# Patient Record
Sex: MALE | Race: WHITE
[De-identification: names, ages, dates, MRNs, and addresses within clinical notes are randomized per-mention and may not be internally consistent; named-entity substitution may affect disease eponyms.]

---

## 2016-01-01 LAB — HGBA1C: 7.8 % (ref ?–5.7)

## 2017-01-01 ENCOUNTER — PRIOR ORIGINAL RECORDS (OUTPATIENT)
Dept: OTHER | Age: 77
End: 2017-01-01

## 2017-01-01 ENCOUNTER — HOSPITAL ENCOUNTER (OUTPATIENT)
Dept: CARDIOLOGY CLINIC | Facility: HOSPITAL | Age: 77
Discharge: HOME | End: 2017-01-01
Attending: NURSE PRACTITIONER
Payer: MEDICARE

## 2017-01-01 ENCOUNTER — SNF/IP PROF CHARGE ONLY (OUTPATIENT)
Dept: HEMATOLOGY/ONCOLOGY | Facility: HOSPITAL | Age: 77
End: 2017-01-01

## 2017-01-01 ENCOUNTER — TELEPHONE (OUTPATIENT)
Dept: INTERNAL MEDICINE CLINIC | Facility: CLINIC | Age: 77
End: 2017-01-01

## 2017-01-01 ENCOUNTER — APPOINTMENT (OUTPATIENT)
Dept: CT IMAGING | Facility: HOSPITAL | Age: 77
DRG: 291 | End: 2017-01-01
Attending: INTERNAL MEDICINE
Payer: MEDICARE

## 2017-01-01 ENCOUNTER — TELEPHONE (OUTPATIENT)
Dept: CARDIOLOGY CLINIC | Facility: HOSPITAL | Age: 77
End: 2017-01-01

## 2017-01-01 ENCOUNTER — OFFICE VISIT (OUTPATIENT)
Dept: HEMATOLOGY/ONCOLOGY | Age: 77
End: 2017-01-01
Attending: INTERNAL MEDICINE
Payer: MEDICARE

## 2017-01-01 ENCOUNTER — APPOINTMENT (OUTPATIENT)
Dept: INTERVENTIONAL RADIOLOGY/VASCULAR | Facility: HOSPITAL | Age: 77
DRG: 682 | End: 2017-01-01
Attending: INTERNAL MEDICINE
Payer: MEDICARE

## 2017-01-01 ENCOUNTER — HOSPITAL ENCOUNTER (OUTPATIENT)
Dept: CARDIOLOGY CLINIC | Facility: HOSPITAL | Age: 77
End: 2017-01-01
Attending: NURSE PRACTITIONER
Payer: MEDICARE

## 2017-01-01 ENCOUNTER — OFFICE VISIT (OUTPATIENT)
Dept: INTERNAL MEDICINE CLINIC | Facility: CLINIC | Age: 77
End: 2017-01-01

## 2017-01-01 ENCOUNTER — PATIENT OUTREACH (OUTPATIENT)
Dept: INTERNAL MEDICINE CLINIC | Facility: CLINIC | Age: 77
End: 2017-01-01

## 2017-01-01 ENCOUNTER — APPOINTMENT (OUTPATIENT)
Dept: HEMATOLOGY/ONCOLOGY | Age: 77
End: 2017-01-01
Attending: INTERNAL MEDICINE
Payer: MEDICARE

## 2017-01-01 ENCOUNTER — PATIENT OUTREACH (OUTPATIENT)
Dept: CASE MANAGEMENT | Age: 77
End: 2017-01-01

## 2017-01-01 ENCOUNTER — HOSPITAL ENCOUNTER (INPATIENT)
Facility: HOSPITAL | Age: 77
LOS: 4 days | Discharge: HOME HEALTH | DRG: 291 | End: 2017-01-01
Attending: EMERGENCY MEDICINE | Admitting: HOSPITALIST
Payer: MEDICARE

## 2017-01-01 ENCOUNTER — HOSPITAL ENCOUNTER (OUTPATIENT)
Dept: ULTRASOUND IMAGING | Facility: HOSPITAL | Age: 77
Discharge: HOME | DRG: 291 | End: 2017-01-01
Attending: ORTHOPAEDIC SURGERY
Payer: MEDICARE

## 2017-01-01 ENCOUNTER — TELEPHONE (OUTPATIENT)
Dept: CARDIOLOGY UNIT | Facility: HOSPITAL | Age: 77
End: 2017-01-01

## 2017-01-01 ENCOUNTER — HOSPITAL ENCOUNTER (OUTPATIENT)
Dept: NUCLEAR MEDICINE | Facility: HOSPITAL | Age: 77
Discharge: HOME | End: 2017-01-01
Attending: INTERNAL MEDICINE
Payer: MEDICARE

## 2017-01-01 ENCOUNTER — APPOINTMENT (OUTPATIENT)
Dept: GENERAL RADIOLOGY | Facility: HOSPITAL | Age: 77
DRG: 682 | End: 2017-01-01
Attending: INTERNAL MEDICINE
Payer: MEDICARE

## 2017-01-01 ENCOUNTER — TELEPHONE (OUTPATIENT)
Dept: HEMATOLOGY/ONCOLOGY | Facility: HOSPITAL | Age: 77
End: 2017-01-01

## 2017-01-01 ENCOUNTER — HOSPITAL ENCOUNTER (OUTPATIENT)
Dept: CT IMAGING | Age: 77
Discharge: HOME | End: 2017-01-01
Attending: INTERNAL MEDICINE
Payer: MEDICARE

## 2017-01-01 ENCOUNTER — OFFICE VISIT (OUTPATIENT)
Dept: HEMATOLOGY/ONCOLOGY | Age: 77
End: 2017-01-01
Attending: NURSE PRACTITIONER
Payer: MEDICARE

## 2017-01-01 ENCOUNTER — OFFICE VISIT (OUTPATIENT)
Dept: HEMATOLOGY/ONCOLOGY | Facility: HOSPITAL | Age: 77
End: 2017-01-01
Attending: INTERNAL MEDICINE
Payer: MEDICARE

## 2017-01-01 ENCOUNTER — HOSPITAL ENCOUNTER (INPATIENT)
Facility: HOSPITAL | Age: 77
LOS: 6 days | DRG: 682 | End: 2017-01-01
Attending: HOSPITALIST | Admitting: HOSPITALIST
Payer: MEDICARE

## 2017-01-01 ENCOUNTER — DIETICIAN VISIT (OUTPATIENT)
Dept: HEMATOLOGY/ONCOLOGY | Facility: HOSPITAL | Age: 77
End: 2017-01-01

## 2017-01-01 ENCOUNTER — APPOINTMENT (OUTPATIENT)
Dept: GENERAL RADIOLOGY | Facility: HOSPITAL | Age: 77
DRG: 291 | End: 2017-01-01
Attending: INTERNAL MEDICINE
Payer: MEDICARE

## 2017-01-01 ENCOUNTER — APPOINTMENT (OUTPATIENT)
Dept: GENERAL RADIOLOGY | Facility: HOSPITAL | Age: 77
DRG: 682 | End: 2017-01-01
Attending: HOSPITALIST
Payer: MEDICARE

## 2017-01-01 ENCOUNTER — HOSPITAL ENCOUNTER (OUTPATIENT)
Dept: LAB | Facility: HOSPITAL | Age: 77
Discharge: HOME | End: 2017-01-01
Attending: NURSE PRACTITIONER
Payer: MEDICARE

## 2017-01-01 ENCOUNTER — OFFICE VISIT (OUTPATIENT)
Dept: HEMATOLOGY/ONCOLOGY | Facility: HOSPITAL | Age: 77
End: 2017-01-01
Attending: NURSE PRACTITIONER
Payer: MEDICARE

## 2017-01-01 ENCOUNTER — HOSPITAL ENCOUNTER (OUTPATIENT)
Dept: GENERAL RADIOLOGY | Age: 77
Discharge: HOME | End: 2017-01-01
Attending: CLINICAL NURSE SPECIALIST
Payer: MEDICARE

## 2017-01-01 ENCOUNTER — APPOINTMENT (OUTPATIENT)
Dept: CV DIAGNOSTICS | Facility: HOSPITAL | Age: 77
DRG: 291 | End: 2017-01-01
Attending: INTERNAL MEDICINE
Payer: MEDICARE

## 2017-01-01 ENCOUNTER — HOSPITAL ENCOUNTER (OUTPATIENT)
Dept: GENERAL RADIOLOGY | Age: 77
Discharge: HOME | End: 2017-01-01
Attending: INTERNAL MEDICINE
Payer: MEDICARE

## 2017-01-01 ENCOUNTER — APPOINTMENT (OUTPATIENT)
Dept: HEMATOLOGY/ONCOLOGY | Facility: HOSPITAL | Age: 77
End: 2017-01-01
Payer: MEDICARE

## 2017-01-01 ENCOUNTER — APPOINTMENT (OUTPATIENT)
Dept: HEMATOLOGY/ONCOLOGY | Age: 77
End: 2017-01-01
Payer: MEDICARE

## 2017-01-01 ENCOUNTER — LAB ENCOUNTER (OUTPATIENT)
Dept: LAB | Age: 77
End: 2017-01-01
Attending: INTERNAL MEDICINE
Payer: MEDICARE

## 2017-01-01 ENCOUNTER — MED REC SCAN ONLY (OUTPATIENT)
Dept: INTERNAL MEDICINE CLINIC | Facility: CLINIC | Age: 77
End: 2017-01-01

## 2017-01-01 ENCOUNTER — APPOINTMENT (OUTPATIENT)
Dept: GENERAL RADIOLOGY | Facility: HOSPITAL | Age: 77
DRG: 291 | End: 2017-01-01
Attending: EMERGENCY MEDICINE
Payer: MEDICARE

## 2017-01-01 VITALS
SYSTOLIC BLOOD PRESSURE: 104 MMHG | OXYGEN SATURATION: 100 % | DIASTOLIC BLOOD PRESSURE: 57 MMHG | BODY MASS INDEX: 32.67 KG/M2 | RESPIRATION RATE: 20 BRPM | HEART RATE: 88 BPM | WEIGHT: 246.5 LBS | TEMPERATURE: 97 F | HEIGHT: 73 IN

## 2017-01-01 VITALS
WEIGHT: 260 LBS | DIASTOLIC BLOOD PRESSURE: 74 MMHG | HEART RATE: 62 BPM | TEMPERATURE: 98 F | BODY MASS INDEX: 34.46 KG/M2 | HEIGHT: 72.99 IN | SYSTOLIC BLOOD PRESSURE: 122 MMHG | OXYGEN SATURATION: 95 % | RESPIRATION RATE: 20 BRPM

## 2017-01-01 VITALS
OXYGEN SATURATION: 96 % | TEMPERATURE: 99 F | HEART RATE: 76 BPM | HEIGHT: 72.99 IN | SYSTOLIC BLOOD PRESSURE: 129 MMHG | DIASTOLIC BLOOD PRESSURE: 47 MMHG | BODY MASS INDEX: 34.72 KG/M2 | RESPIRATION RATE: 20 BRPM | WEIGHT: 262 LBS

## 2017-01-01 VITALS
OXYGEN SATURATION: 94 % | TEMPERATURE: 98 F | HEIGHT: 70.98 IN | RESPIRATION RATE: 20 BRPM | HEART RATE: 87 BPM | WEIGHT: 262.19 LBS | DIASTOLIC BLOOD PRESSURE: 45 MMHG | BODY MASS INDEX: 36.71 KG/M2 | SYSTOLIC BLOOD PRESSURE: 92 MMHG

## 2017-01-01 VITALS
SYSTOLIC BLOOD PRESSURE: 110 MMHG | RESPIRATION RATE: 20 BRPM | BODY MASS INDEX: 37 KG/M2 | DIASTOLIC BLOOD PRESSURE: 62 MMHG | HEART RATE: 83 BPM | OXYGEN SATURATION: 98 % | WEIGHT: 279.19 LBS

## 2017-01-01 VITALS
DIASTOLIC BLOOD PRESSURE: 72 MMHG | HEART RATE: 85 BPM | RESPIRATION RATE: 20 BRPM | BODY MASS INDEX: 37 KG/M2 | SYSTOLIC BLOOD PRESSURE: 111 MMHG | TEMPERATURE: 98 F | WEIGHT: 266.69 LBS | RESPIRATION RATE: 18 BRPM | OXYGEN SATURATION: 98 % | DIASTOLIC BLOOD PRESSURE: 63 MMHG | HEART RATE: 80 BPM | TEMPERATURE: 98 F | SYSTOLIC BLOOD PRESSURE: 108 MMHG | OXYGEN SATURATION: 95 %

## 2017-01-01 VITALS
HEART RATE: 80 BPM | SYSTOLIC BLOOD PRESSURE: 119 MMHG | OXYGEN SATURATION: 91 % | BODY MASS INDEX: 38 KG/M2 | RESPIRATION RATE: 18 BRPM | WEIGHT: 272.19 LBS | DIASTOLIC BLOOD PRESSURE: 64 MMHG

## 2017-01-01 VITALS
SYSTOLIC BLOOD PRESSURE: 100 MMHG | RESPIRATION RATE: 20 BRPM | TEMPERATURE: 98 F | HEART RATE: 81 BPM | OXYGEN SATURATION: 96 % | BODY MASS INDEX: 36 KG/M2 | DIASTOLIC BLOOD PRESSURE: 63 MMHG | WEIGHT: 256 LBS

## 2017-01-01 VITALS
RESPIRATION RATE: 18 BRPM | OXYGEN SATURATION: 92 % | HEART RATE: 81 BPM | SYSTOLIC BLOOD PRESSURE: 123 MMHG | DIASTOLIC BLOOD PRESSURE: 78 MMHG | TEMPERATURE: 97 F

## 2017-01-01 VITALS
DIASTOLIC BLOOD PRESSURE: 53 MMHG | BODY MASS INDEX: 35 KG/M2 | SYSTOLIC BLOOD PRESSURE: 105 MMHG | OXYGEN SATURATION: 96 % | RESPIRATION RATE: 16 BRPM | HEART RATE: 76 BPM | WEIGHT: 247.5 LBS

## 2017-01-01 VITALS
BODY MASS INDEX: 37 KG/M2 | RESPIRATION RATE: 20 BRPM | HEART RATE: 68 BPM | WEIGHT: 265.19 LBS | SYSTOLIC BLOOD PRESSURE: 108 MMHG | OXYGEN SATURATION: 91 % | DIASTOLIC BLOOD PRESSURE: 57 MMHG

## 2017-01-01 VITALS
TEMPERATURE: 98 F | BODY MASS INDEX: 37.38 KG/M2 | DIASTOLIC BLOOD PRESSURE: 75 MMHG | WEIGHT: 267 LBS | RESPIRATION RATE: 18 BRPM | HEART RATE: 61 BPM | SYSTOLIC BLOOD PRESSURE: 104 MMHG | OXYGEN SATURATION: 90 % | HEIGHT: 70.98 IN

## 2017-01-01 VITALS
BODY MASS INDEX: 37 KG/M2 | WEIGHT: 262.13 LBS | DIASTOLIC BLOOD PRESSURE: 48 MMHG | SYSTOLIC BLOOD PRESSURE: 106 MMHG | OXYGEN SATURATION: 94 % | RESPIRATION RATE: 16 BRPM | HEART RATE: 83 BPM

## 2017-01-01 VITALS
DIASTOLIC BLOOD PRESSURE: 63 MMHG | HEART RATE: 75 BPM | SYSTOLIC BLOOD PRESSURE: 129 MMHG | BODY MASS INDEX: 37 KG/M2 | WEIGHT: 263.19 LBS | OXYGEN SATURATION: 96 % | RESPIRATION RATE: 18 BRPM

## 2017-01-01 VITALS
HEART RATE: 79 BPM | OXYGEN SATURATION: 91 % | HEIGHT: 71 IN | SYSTOLIC BLOOD PRESSURE: 101 MMHG | BODY MASS INDEX: 38.08 KG/M2 | RESPIRATION RATE: 20 BRPM | DIASTOLIC BLOOD PRESSURE: 60 MMHG | WEIGHT: 272 LBS | TEMPERATURE: 97 F

## 2017-01-01 VITALS
TEMPERATURE: 98 F | HEART RATE: 87 BPM | WEIGHT: 260.19 LBS | RESPIRATION RATE: 20 BRPM | BODY MASS INDEX: 36 KG/M2 | SYSTOLIC BLOOD PRESSURE: 104 MMHG | OXYGEN SATURATION: 97 % | DIASTOLIC BLOOD PRESSURE: 69 MMHG

## 2017-01-01 VITALS
HEIGHT: 73 IN | DIASTOLIC BLOOD PRESSURE: 60 MMHG | OXYGEN SATURATION: 95 % | SYSTOLIC BLOOD PRESSURE: 110 MMHG | RESPIRATION RATE: 16 BRPM | TEMPERATURE: 98 F | WEIGHT: 260 LBS | HEART RATE: 86 BPM | BODY MASS INDEX: 34.46 KG/M2

## 2017-01-01 VITALS
DIASTOLIC BLOOD PRESSURE: 48 MMHG | HEIGHT: 70.98 IN | RESPIRATION RATE: 16 BRPM | HEART RATE: 83 BPM | WEIGHT: 262 LBS | OXYGEN SATURATION: 94 % | BODY MASS INDEX: 36.68 KG/M2 | SYSTOLIC BLOOD PRESSURE: 106 MMHG | TEMPERATURE: 98 F

## 2017-01-01 VITALS
SYSTOLIC BLOOD PRESSURE: 81 MMHG | WEIGHT: 247.56 LBS | OXYGEN SATURATION: 94 % | BODY MASS INDEX: 33 KG/M2 | DIASTOLIC BLOOD PRESSURE: 46 MMHG | TEMPERATURE: 98 F

## 2017-01-01 VITALS
SYSTOLIC BLOOD PRESSURE: 107 MMHG | HEART RATE: 85 BPM | OXYGEN SATURATION: 96 % | DIASTOLIC BLOOD PRESSURE: 68 MMHG | RESPIRATION RATE: 20 BRPM | TEMPERATURE: 98 F

## 2017-01-01 VITALS
TEMPERATURE: 98 F | OXYGEN SATURATION: 95 % | RESPIRATION RATE: 14 BRPM | SYSTOLIC BLOOD PRESSURE: 118 MMHG | DIASTOLIC BLOOD PRESSURE: 54 MMHG | HEART RATE: 92 BPM

## 2017-01-01 VITALS
HEART RATE: 67 BPM | WEIGHT: 260.13 LBS | DIASTOLIC BLOOD PRESSURE: 61 MMHG | OXYGEN SATURATION: 95 % | BODY MASS INDEX: 34 KG/M2 | RESPIRATION RATE: 24 BRPM | SYSTOLIC BLOOD PRESSURE: 119 MMHG

## 2017-01-01 VITALS
OXYGEN SATURATION: 98 % | RESPIRATION RATE: 20 BRPM | SYSTOLIC BLOOD PRESSURE: 109 MMHG | HEIGHT: 70.98 IN | HEART RATE: 81 BPM | DIASTOLIC BLOOD PRESSURE: 73 MMHG | TEMPERATURE: 98 F | WEIGHT: 252 LBS | BODY MASS INDEX: 35.28 KG/M2

## 2017-01-01 VITALS
HEART RATE: 63 BPM | SYSTOLIC BLOOD PRESSURE: 120 MMHG | RESPIRATION RATE: 16 BRPM | DIASTOLIC BLOOD PRESSURE: 56 MMHG | BODY MASS INDEX: 35 KG/M2 | OXYGEN SATURATION: 92 % | WEIGHT: 268.5 LBS

## 2017-01-01 VITALS
SYSTOLIC BLOOD PRESSURE: 112 MMHG | RESPIRATION RATE: 18 BRPM | DIASTOLIC BLOOD PRESSURE: 69 MMHG | TEMPERATURE: 98 F | HEART RATE: 73 BPM

## 2017-01-01 VITALS
SYSTOLIC BLOOD PRESSURE: 108 MMHG | OXYGEN SATURATION: 93 % | HEART RATE: 84 BPM | TEMPERATURE: 98 F | DIASTOLIC BLOOD PRESSURE: 71 MMHG | RESPIRATION RATE: 18 BRPM

## 2017-01-01 VITALS
RESPIRATION RATE: 20 BRPM | DIASTOLIC BLOOD PRESSURE: 62 MMHG | HEART RATE: 75 BPM | WEIGHT: 271.63 LBS | OXYGEN SATURATION: 88 % | BODY MASS INDEX: 36 KG/M2 | SYSTOLIC BLOOD PRESSURE: 109 MMHG

## 2017-01-01 VITALS
TEMPERATURE: 99 F | BODY MASS INDEX: 34.46 KG/M2 | SYSTOLIC BLOOD PRESSURE: 112 MMHG | HEIGHT: 73 IN | OXYGEN SATURATION: 96 % | HEART RATE: 74 BPM | DIASTOLIC BLOOD PRESSURE: 72 MMHG | RESPIRATION RATE: 16 BRPM | WEIGHT: 260 LBS

## 2017-01-01 VITALS
BODY MASS INDEX: 35 KG/M2 | WEIGHT: 265.31 LBS | RESPIRATION RATE: 18 BRPM | DIASTOLIC BLOOD PRESSURE: 73 MMHG | SYSTOLIC BLOOD PRESSURE: 118 MMHG | HEART RATE: 72 BPM | OXYGEN SATURATION: 96 %

## 2017-01-01 VITALS
SYSTOLIC BLOOD PRESSURE: 114 MMHG | HEART RATE: 72 BPM | DIASTOLIC BLOOD PRESSURE: 64 MMHG | WEIGHT: 278.13 LBS | OXYGEN SATURATION: 94 % | BODY MASS INDEX: 39 KG/M2

## 2017-01-01 DIAGNOSIS — M25.511 ACUTE PAIN OF RIGHT SHOULDER: Primary | ICD-10-CM

## 2017-01-01 DIAGNOSIS — I50.9 ACUTE ON CHRONIC CONGESTIVE HEART FAILURE, UNSPECIFIED CONGESTIVE HEART FAILURE TYPE: ICD-10-CM

## 2017-01-01 DIAGNOSIS — R09.89 CHEST CONGESTION: Primary | ICD-10-CM

## 2017-01-01 DIAGNOSIS — C34.82 MALIGNANT NEOPLASM OF OVERLAPPING SITES OF LEFT LUNG (HCC): ICD-10-CM

## 2017-01-01 DIAGNOSIS — T45.1X5A PANCYTOPENIA DUE TO ANTINEOPLASTIC CHEMOTHERAPY (HCC): ICD-10-CM

## 2017-01-01 DIAGNOSIS — I50.22 CHRONIC SYSTOLIC HF (HEART FAILURE) (HCC): ICD-10-CM

## 2017-01-01 DIAGNOSIS — C34.82 MALIGNANT NEOPLASM OF OVERLAPPING SITES OF LEFT LUNG (HCC): Primary | ICD-10-CM

## 2017-01-01 DIAGNOSIS — C34.32 MALIGNANT NEOPLASM OF LOWER LOBE OF LEFT LUNG (HCC): ICD-10-CM

## 2017-01-01 DIAGNOSIS — M25.531 PAIN IN BOTH WRISTS: ICD-10-CM

## 2017-01-01 DIAGNOSIS — K21.9 GASTROESOPHAGEAL REFLUX DISEASE WITHOUT ESOPHAGITIS: Primary | ICD-10-CM

## 2017-01-01 DIAGNOSIS — I50.22 CHRONIC SYSTOLIC HF (HEART FAILURE) (HCC): Primary | ICD-10-CM

## 2017-01-01 DIAGNOSIS — R62.7 FAILURE TO THRIVE IN ADULT: ICD-10-CM

## 2017-01-01 DIAGNOSIS — D64.9 ANEMIA: ICD-10-CM

## 2017-01-01 DIAGNOSIS — D63.8 ANEMIA, CHRONIC DISEASE: ICD-10-CM

## 2017-01-01 DIAGNOSIS — T50.905D MEDICATION SIDE EFFECT, SUBSEQUENT ENCOUNTER: ICD-10-CM

## 2017-01-01 DIAGNOSIS — R53.1 WEAKNESS: ICD-10-CM

## 2017-01-01 DIAGNOSIS — E87.1 HYPONATREMIA: ICD-10-CM

## 2017-01-01 DIAGNOSIS — E11.42 DIABETIC POLYNEUROPATHY ASSOCIATED WITH TYPE 2 DIABETES MELLITUS (HCC): ICD-10-CM

## 2017-01-01 DIAGNOSIS — B35.6 FUNGAL INFECTION OF THE GROIN: ICD-10-CM

## 2017-01-01 DIAGNOSIS — D69.6 THROMBOCYTOPENIA (HCC): ICD-10-CM

## 2017-01-01 DIAGNOSIS — M25.511 RIGHT SHOULDER PAIN, UNSPECIFIED CHRONICITY: ICD-10-CM

## 2017-01-01 DIAGNOSIS — T45.1X5A ANEMIA DUE TO CHEMOTHERAPY: ICD-10-CM

## 2017-01-01 DIAGNOSIS — R59.0 MEDIASTINAL ADENOPATHY: ICD-10-CM

## 2017-01-01 DIAGNOSIS — D61.818 PANCYTOPENIA (HCC): ICD-10-CM

## 2017-01-01 DIAGNOSIS — R05.9 COUGH: Primary | ICD-10-CM

## 2017-01-01 DIAGNOSIS — C34.12 MALIGNANT NEOPLASM OF UPPER LOBE OF LEFT LUNG (HCC): ICD-10-CM

## 2017-01-01 DIAGNOSIS — I20.8 ATYPICAL ANGINA (HCC): Primary | ICD-10-CM

## 2017-01-01 DIAGNOSIS — M54.50 CHRONIC BILATERAL LOW BACK PAIN WITHOUT SCIATICA: ICD-10-CM

## 2017-01-01 DIAGNOSIS — J44.1 COPD EXACERBATION (HCC): Primary | ICD-10-CM

## 2017-01-01 DIAGNOSIS — D64.81 ANEMIA DUE TO CHEMOTHERAPY: ICD-10-CM

## 2017-01-01 DIAGNOSIS — E11.21 DIABETIC NEPHROPATHY ASSOCIATED WITH TYPE 2 DIABETES MELLITUS (HCC): ICD-10-CM

## 2017-01-01 DIAGNOSIS — I50.22 CHRONIC SYSTOLIC CHF (CONGESTIVE HEART FAILURE), NYHA CLASS 3 (HCC): Primary | ICD-10-CM

## 2017-01-01 DIAGNOSIS — C34.91 BRONCHOGENIC CANCER OF RIGHT LUNG (HCC): Primary | ICD-10-CM

## 2017-01-01 DIAGNOSIS — C34.81 MALIGNANT NEOPLASM OF OVERLAPPING SITES OF RIGHT LUNG (HCC): Primary | ICD-10-CM

## 2017-01-01 DIAGNOSIS — D64.81 ANEMIA DUE TO ANTINEOPLASTIC CHEMOTHERAPY: ICD-10-CM

## 2017-01-01 DIAGNOSIS — D75.89 MACROCYTOSIS: ICD-10-CM

## 2017-01-01 DIAGNOSIS — R59.0 MEDIASTINAL ADENOPATHY: Primary | ICD-10-CM

## 2017-01-01 DIAGNOSIS — C34.81 MALIGNANT NEOPLASM OF OVERLAPPING SITES OF RIGHT LUNG (HCC): ICD-10-CM

## 2017-01-01 DIAGNOSIS — I50.9 CHF (CONGESTIVE HEART FAILURE) (HCC): ICD-10-CM

## 2017-01-01 DIAGNOSIS — I50.22 CHRONIC SYSTOLIC HEART FAILURE (HCC): Primary | ICD-10-CM

## 2017-01-01 DIAGNOSIS — D61.810 PANCYTOPENIA DUE TO ANTINEOPLASTIC CHEMOTHERAPY (HCC): Primary | ICD-10-CM

## 2017-01-01 DIAGNOSIS — M25.532 PAIN IN BOTH WRISTS: ICD-10-CM

## 2017-01-01 DIAGNOSIS — J44.9 CHRONIC OBSTRUCTIVE PULMONARY DISEASE, UNSPECIFIED COPD TYPE (HCC): Primary | ICD-10-CM

## 2017-01-01 DIAGNOSIS — I20.8 ATYPICAL ANGINA (HCC): ICD-10-CM

## 2017-01-01 DIAGNOSIS — D75.89 MACROCYTOSIS: Primary | ICD-10-CM

## 2017-01-01 DIAGNOSIS — I50.9 ACUTE ON CHRONIC CONGESTIVE HEART FAILURE, UNSPECIFIED CONGESTIVE HEART FAILURE TYPE: Primary | ICD-10-CM

## 2017-01-01 DIAGNOSIS — Z71.9 ENCOUNTER FOR EDUCATION: Primary | ICD-10-CM

## 2017-01-01 DIAGNOSIS — M25.811 MASS OF JOINT OF RIGHT SHOULDER: ICD-10-CM

## 2017-01-01 DIAGNOSIS — M25.511 SHOULDER PAIN, RIGHT: ICD-10-CM

## 2017-01-01 DIAGNOSIS — M25.511 ACUTE PAIN OF RIGHT SHOULDER: ICD-10-CM

## 2017-01-01 DIAGNOSIS — T45.1X5A PANCYTOPENIA DUE TO ANTINEOPLASTIC CHEMOTHERAPY (HCC): Primary | ICD-10-CM

## 2017-01-01 DIAGNOSIS — R77.8 ELEVATED TROPONIN: ICD-10-CM

## 2017-01-01 DIAGNOSIS — C34.90 MALIGNANT NEOPLASM OF LUNG (HCC): ICD-10-CM

## 2017-01-01 DIAGNOSIS — I50.22 CHRONIC SYSTOLIC CHF (CONGESTIVE HEART FAILURE), NYHA CLASS 3 (HCC): ICD-10-CM

## 2017-01-01 DIAGNOSIS — M17.0 PRIMARY OSTEOARTHRITIS OF BOTH KNEES: Primary | ICD-10-CM

## 2017-01-01 DIAGNOSIS — D61.810 PANCYTOPENIA DUE TO ANTINEOPLASTIC CHEMOTHERAPY (HCC): ICD-10-CM

## 2017-01-01 DIAGNOSIS — E78.2 MIXED HYPERLIPIDEMIA: ICD-10-CM

## 2017-01-01 DIAGNOSIS — J44.1 COPD EXACERBATION (HCC): ICD-10-CM

## 2017-01-01 DIAGNOSIS — N18.30 CKD (CHRONIC KIDNEY DISEASE) STAGE 3, GFR 30-59 ML/MIN (HCC): Primary | ICD-10-CM

## 2017-01-01 DIAGNOSIS — F34.1 DYSTHYMIA: ICD-10-CM

## 2017-01-01 DIAGNOSIS — G89.29 CHRONIC BILATERAL LOW BACK PAIN WITHOUT SCIATICA: ICD-10-CM

## 2017-01-01 DIAGNOSIS — R07.89 CHEST WALL PAIN: Primary | ICD-10-CM

## 2017-01-01 DIAGNOSIS — M10.9 GOUT, UNSPECIFIED: ICD-10-CM

## 2017-01-01 DIAGNOSIS — D69.6 THROMBOCYTOPENIA (HCC): Primary | ICD-10-CM

## 2017-01-01 DIAGNOSIS — B02.9 HERPES ZOSTER WITHOUT COMPLICATION: ICD-10-CM

## 2017-01-01 DIAGNOSIS — R53.1 WEAKNESS: Primary | ICD-10-CM

## 2017-01-01 DIAGNOSIS — I24.8 DEMAND ISCHEMIA (HCC): ICD-10-CM

## 2017-01-01 DIAGNOSIS — M10.9 GOUT, UNSPECIFIED: Primary | ICD-10-CM

## 2017-01-01 DIAGNOSIS — I50.30 DIASTOLIC CONGESTIVE HEART FAILURE, UNSPECIFIED CONGESTIVE HEART FAILURE CHRONICITY: Primary | ICD-10-CM

## 2017-01-01 DIAGNOSIS — T45.1X5A ANEMIA DUE TO ANTINEOPLASTIC CHEMOTHERAPY: ICD-10-CM

## 2017-01-01 DIAGNOSIS — R07.89 CHEST WALL PAIN: ICD-10-CM

## 2017-01-01 LAB
ALBUMIN SERPL-MCNC: 2.2 G/DL (ref 3.5–4.8)
ALBUMIN SERPL-MCNC: 2.3 G/DL (ref 3.5–4.8)
ALBUMIN SERPL-MCNC: 2.3 G/DL (ref 3.5–4.8)
ALBUMIN SERPL-MCNC: 2.5 G/DL (ref 3.5–4.8)
ALBUMIN SERPL-MCNC: 2.6 G/DL (ref 3.5–4.8)
ALBUMIN SERPL-MCNC: 2.8 G/DL (ref 3.5–4.8)
ALBUMIN SERPL-MCNC: 2.8 G/DL (ref 3.5–4.8)
ALBUMIN SERPL-MCNC: 2.9 G/DL (ref 3.5–4.8)
ALBUMIN SERPL-MCNC: 3 G/DL (ref 3.5–4.8)
ALBUMIN SERPL-MCNC: 3 G/DL (ref 3.5–4.8)
ALBUMIN SERPL-MCNC: 3.2 G/DL (ref 3.5–4.8)
ALBUMIN SERPL-MCNC: 3.3 G/DL (ref 3.5–4.8)
ALBUMIN: 3 G/DL
ALBUMIN: 3.3 G/DL
ALKALINE PHOSPHATATE(ALK PHOS): 64 IU/L
ALKALINE PHOSPHATATE(ALK PHOS): 71 IU/L
ALLENS TEST: POSITIVE
ALP LIVER SERPL-CCNC: 109 U/L (ref 45–117)
ALP LIVER SERPL-CCNC: 120 U/L (ref 45–117)
ALP LIVER SERPL-CCNC: 67 U/L (ref 45–117)
ALP LIVER SERPL-CCNC: 71 U/L (ref 45–117)
ALP LIVER SERPL-CCNC: 74 U/L (ref 45–117)
ALP LIVER SERPL-CCNC: 81 U/L (ref 45–117)
ALP LIVER SERPL-CCNC: 82 U/L (ref 45–117)
ALP LIVER SERPL-CCNC: 84 U/L (ref 45–117)
ALP LIVER SERPL-CCNC: 85 U/L (ref 45–117)
ALP LIVER SERPL-CCNC: 86 U/L (ref 45–117)
ALP LIVER SERPL-CCNC: 88 U/L (ref 45–117)
ALP LIVER SERPL-CCNC: 94 U/L (ref 45–117)
ALT SERPL-CCNC: 14 U/L (ref 17–63)
ALT SERPL-CCNC: 16 U/L (ref 17–63)
ALT SERPL-CCNC: 21 U/L (ref 17–63)
ALT SERPL-CCNC: 22 U/L (ref 17–63)
ALT SERPL-CCNC: 27 U/L (ref 17–63)
ALT SERPL-CCNC: 292 U/L (ref 17–63)
ALT SERPL-CCNC: 330 U/L (ref 17–63)
ALT SERPL-CCNC: 34 U/L (ref 17–63)
ALT SERPL-CCNC: 380 U/L (ref 17–63)
ALT SERPL-CCNC: 413 U/L (ref 17–63)
ALT SERPL-CCNC: 50 U/L (ref 17–63)
ALT SERPL-CCNC: 96 U/L (ref 17–63)
AMMONIA: 46 UMOL/L (ref 11–32)
ANTIBODY SCREEN: NEGATIVE
APTT PPP: 28.6 SECONDS (ref 25–34)
APTT PPP: 35.9 SECONDS (ref 25–34)
APTT PPP: 38.9 SECONDS (ref 25–34)
APTT PPP: 48.9 SECONDS (ref 25–34)
APTT PPP: 49.5 SECONDS (ref 25–34)
APTT PPP: 51.8 SECONDS (ref 25–34)
APTT PPP: 55.9 SECONDS (ref 25–34)
ARTERIAL BLD GAS O2 SATURATION: 82 % (ref 92–100)
ARTERIAL BLD GAS O2 SATURATION: 91 % (ref 92–100)
ARTERIAL BLD GAS O2 SATURATION: 93 % (ref 92–100)
ARTERIAL BLD GAS O2 SATURATION: 94 % (ref 92–100)
ARTERIAL BLD GAS O2 SATURATION: 95 % (ref 92–100)
ARTERIAL BLD GAS O2 SATURATION: 96 % (ref 92–100)
ARTERIAL BLOOD GAS BASE EXCESS: -1
ARTERIAL BLOOD GAS BASE EXCESS: -3.3
ARTERIAL BLOOD GAS BASE EXCESS: -5.3
ARTERIAL BLOOD GAS BASE EXCESS: -5.8
ARTERIAL BLOOD GAS BASE EXCESS: -6.4
ARTERIAL BLOOD GAS BASE EXCESS: 3.8
ARTERIAL BLOOD GAS BASE EXCESS: 5.6
ARTERIAL BLOOD GAS BASE EXCESS: 7.8
ARTERIAL BLOOD GAS HCO3: 20.7 MEQ/L (ref 22–26)
ARTERIAL BLOOD GAS HCO3: 21.9 MEQ/L (ref 22–26)
ARTERIAL BLOOD GAS HCO3: 24.3 MEQ/L (ref 22–26)
ARTERIAL BLOOD GAS HCO3: 25.8 MEQ/L (ref 22–26)
ARTERIAL BLOOD GAS HCO3: 26.2 MEQ/L (ref 22–26)
ARTERIAL BLOOD GAS HCO3: 29.9 MEQ/L (ref 22–26)
ARTERIAL BLOOD GAS HCO3: 30.9 MEQ/L (ref 22–26)
ARTERIAL BLOOD GAS HCO3: 32.9 MEQ/L (ref 22–26)
ARTERIAL BLOOD GAS PCO2: 49 MM HG (ref 35–45)
ARTERIAL BLOOD GAS PCO2: 51 MM HG (ref 35–45)
ARTERIAL BLOOD GAS PCO2: 53 MM HG (ref 35–45)
ARTERIAL BLOOD GAS PCO2: 55 MM HG (ref 35–45)
ARTERIAL BLOOD GAS PCO2: 57 MM HG (ref 35–45)
ARTERIAL BLOOD GAS PCO2: 93 MM HG (ref 35–45)
ARTERIAL BLOOD GAS PH: 7.07 (ref 7.35–7.45)
ARTERIAL BLOOD GAS PH: 7.25 (ref 7.35–7.45)
ARTERIAL BLOOD GAS PH: 7.3 (ref 7.35–7.45)
ARTERIAL BLOOD GAS PH: 7.37 (ref 7.35–7.45)
ARTERIAL BLOOD GAS PH: 7.42 (ref 7.35–7.45)
ARTERIAL BLOOD GAS PH: 7.45 (ref 7.35–7.45)
ARTERIAL BLOOD GAS PO2: 101 MM HG (ref 80–105)
ARTERIAL BLOOD GAS PO2: 125 MM HG (ref 80–105)
ARTERIAL BLOOD GAS PO2: 149 MM HG (ref 80–105)
ARTERIAL BLOOD GAS PO2: 50 MM HG (ref 80–105)
ARTERIAL BLOOD GAS PO2: 63 MM HG (ref 80–105)
ARTERIAL BLOOD GAS PO2: 65 MM HG (ref 80–105)
ARTERIAL BLOOD GAS PO2: 86 MM HG (ref 80–105)
ARTERIAL BLOOD GAS PO2: 98 MM HG (ref 80–105)
AST SERPL-CCNC: 11 U/L (ref 15–41)
AST SERPL-CCNC: 12 U/L (ref 15–41)
AST SERPL-CCNC: 14 U/L (ref 15–41)
AST SERPL-CCNC: 164 U/L (ref 15–41)
AST SERPL-CCNC: 169 U/L (ref 15–41)
AST SERPL-CCNC: 21 U/L (ref 15–41)
AST SERPL-CCNC: 21 U/L (ref 15–41)
AST SERPL-CCNC: 24 U/L (ref 15–41)
AST SERPL-CCNC: 251 U/L (ref 15–41)
AST SERPL-CCNC: 30 U/L (ref 15–41)
AST SERPL-CCNC: 420 U/L (ref 15–41)
AST SERPL-CCNC: 456 U/L (ref 15–41)
ATRIAL RATE: 0 BPM
ATRIAL RATE: 101 BPM
ATRIAL RATE: 267 BPM
BAND %: 6 %
BASOPHIL % MANUAL: 0 %
BASOPHIL ABSOLUTE MANUAL: 0 X10(3) UL (ref 0–0.1)
BASOPHILS # BLD AUTO: 0 X10(3) UL (ref 0–0.1)
BASOPHILS # BLD AUTO: 0.01 X10(3) UL (ref 0–0.1)
BASOPHILS # BLD AUTO: 0.02 X10(3) UL (ref 0–0.1)
BASOPHILS # BLD AUTO: 0.03 X10(3) UL (ref 0–0.1)
BASOPHILS # BLD AUTO: 0.04 X10(3) UL (ref 0–0.1)
BASOPHILS # BLD AUTO: 0.06 X10(3) UL (ref 0–0.1)
BASOPHILS # BLD AUTO: 0.06 X10(3) UL (ref 0–0.1)
BASOPHILS NFR BLD AUTO: 0 %
BASOPHILS NFR BLD AUTO: 0.1 %
BASOPHILS NFR BLD AUTO: 0.1 %
BASOPHILS NFR BLD AUTO: 0.2 %
BASOPHILS NFR BLD AUTO: 0.2 %
BASOPHILS NFR BLD AUTO: 0.3 %
BASOPHILS NFR BLD AUTO: 0.4 %
BASOPHILS NFR BLD AUTO: 0.4 %
BASOPHILS NFR BLD AUTO: 0.8 %
BASOPHILS NFR BLD AUTO: 0.8 %
BILIRUB SERPL-MCNC: 0.4 MG/DL (ref 0.1–2)
BILIRUB SERPL-MCNC: 0.4 MG/DL (ref 0.1–2)
BILIRUB SERPL-MCNC: 0.5 MG/DL (ref 0.1–2)
BILIRUB SERPL-MCNC: 0.6 MG/DL (ref 0.1–2)
BILIRUB SERPL-MCNC: 0.7 MG/DL (ref 0.1–2)
BILIRUB SERPL-MCNC: 0.8 MG/DL (ref 0.1–2)
BILIRUB SERPL-MCNC: 0.8 MG/DL (ref 0.1–2)
BILIRUB UR QL STRIP.AUTO: NEGATIVE
BILIRUB UR QL STRIP.AUTO: NEGATIVE
BILIRUBIN TOTAL: 0.4 MG/DL
BILIRUBIN TOTAL: 0.4 MG/DL
BLOOD TYPE BARCODE: 6200
BUN BLD-MCNC: 106 MG/DL (ref 8–20)
BUN BLD-MCNC: 107 MG/DL (ref 8–20)
BUN BLD-MCNC: 109 MG/DL (ref 8–20)
BUN BLD-MCNC: 110 MG/DL (ref 8–20)
BUN BLD-MCNC: 110 MG/DL (ref 8–20)
BUN BLD-MCNC: 36 MG/DL (ref 8–20)
BUN BLD-MCNC: 36 MG/DL (ref 8–20)
BUN BLD-MCNC: 37 MG/DL (ref 8–20)
BUN BLD-MCNC: 40 MG/DL (ref 8–20)
BUN BLD-MCNC: 43 MG/DL (ref 8–20)
BUN BLD-MCNC: 44 MG/DL (ref 8–20)
BUN BLD-MCNC: 48 MG/DL (ref 8–20)
BUN BLD-MCNC: 55 MG/DL (ref 8–20)
BUN BLD-MCNC: 57 MG/DL (ref 8–20)
BUN BLD-MCNC: 58 MG/DL (ref 8–20)
BUN BLD-MCNC: 58 MG/DL (ref 8–20)
BUN BLD-MCNC: 59 MG/DL (ref 8–20)
BUN BLD-MCNC: 61 MG/DL (ref 8–20)
BUN BLD-MCNC: 62 MG/DL (ref 8–20)
BUN BLD-MCNC: 62 MG/DL (ref 8–20)
BUN BLD-MCNC: 69 MG/DL (ref 8–20)
BUN BLD-MCNC: 77 MG/DL (ref 8–20)
BUN BLD-MCNC: 96 MG/DL (ref 8–20)
BUN: 42 MG/DL
BUN: 57 MG/DL
CALCIUM BLD-MCNC: 7.9 MG/DL (ref 8.3–10.3)
CALCIUM BLD-MCNC: 8.1 MG/DL (ref 8.3–10.3)
CALCIUM BLD-MCNC: 8.2 MG/DL (ref 8.3–10.3)
CALCIUM BLD-MCNC: 8.3 MG/DL (ref 8.3–10.3)
CALCIUM BLD-MCNC: 8.3 MG/DL (ref 8.3–10.3)
CALCIUM BLD-MCNC: 8.4 MG/DL (ref 8.3–10.3)
CALCIUM BLD-MCNC: 8.5 MG/DL (ref 8.3–10.3)
CALCIUM BLD-MCNC: 8.5 MG/DL (ref 8.3–10.3)
CALCIUM BLD-MCNC: 8.6 MG/DL (ref 8.3–10.3)
CALCIUM BLD-MCNC: 8.7 MG/DL (ref 8.3–10.3)
CALCIUM BLD-MCNC: 8.7 MG/DL (ref 8.3–10.3)
CALCIUM BLD-MCNC: 8.8 MG/DL (ref 8.3–10.3)
CALCIUM BLD-MCNC: 8.8 MG/DL (ref 8.3–10.3)
CALCIUM BLD-MCNC: 8.9 MG/DL (ref 8.3–10.3)
CALCIUM BLD-MCNC: 8.9 MG/DL (ref 8.3–10.3)
CALCIUM BLD-MCNC: 9.1 MG/DL (ref 8.3–10.3)
CALCIUM BLD-MCNC: 9.1 MG/DL (ref 8.3–10.3)
CALCIUM BLD-MCNC: 9.2 MG/DL (ref 8.3–10.3)
CALCIUM: 8.3 MG/DL
CALCIUM: 9 MG/DL
CALCULATED O2 SATURATION: 85 % (ref 92–100)
CALCULATED O2 SATURATION: 93 % (ref 92–100)
CALCULATED O2 SATURATION: 93 % (ref 92–100)
CALCULATED O2 SATURATION: 95 % (ref 92–100)
CALCULATED O2 SATURATION: 96 % (ref 92–100)
CALCULATED O2 SATURATION: 99 % (ref 92–100)
CARBOXYHEMOGLOBIN: 1.2 % SAT (ref 0–3)
CARBOXYHEMOGLOBIN: 1.3 % SAT (ref 0–3)
CARBOXYHEMOGLOBIN: 1.3 % SAT (ref 0–3)
CARBOXYHEMOGLOBIN: 1.4 % SAT (ref 0–3)
CARBOXYHEMOGLOBIN: 1.5 % SAT (ref 0–3)
CARBOXYHEMOGLOBIN: 1.5 % SAT (ref 0–3)
CARBOXYHEMOGLOBIN: 1.6 % SAT (ref 0–3)
CARBOXYHEMOGLOBIN: 1.8 % SAT (ref 0–3)
CHLORIDE: 100 MMOL/L (ref 101–111)
CHLORIDE: 101 MEQ/L
CHLORIDE: 101 MMOL/L (ref 101–111)
CHLORIDE: 102 MEQ/L
CHLORIDE: 102 MMOL/L (ref 101–111)
CHLORIDE: 103 MMOL/L (ref 101–111)
CHLORIDE: 89 MMOL/L (ref 101–111)
CHLORIDE: 90 MMOL/L (ref 101–111)
CHLORIDE: 90 MMOL/L (ref 101–111)
CHLORIDE: 91 MMOL/L (ref 101–111)
CHLORIDE: 92 MMOL/L (ref 101–111)
CHLORIDE: 93 MMOL/L (ref 101–111)
CHLORIDE: 95 MMOL/L (ref 101–111)
CHLORIDE: 95 MMOL/L (ref 101–111)
CHLORIDE: 96 MMOL/L (ref 101–111)
CHLORIDE: 98 MMOL/L (ref 101–111)
CHLORIDE: 99 MMOL/L (ref 101–111)
CHOLEST SMN-MCNC: 92 MG/DL (ref ?–200)
CLARITY UR REFRACT.AUTO: CLEAR
CO2: 20 MMOL/L (ref 22–32)
CO2: 20 MMOL/L (ref 22–32)
CO2: 21 MMOL/L (ref 22–32)
CO2: 21 MMOL/L (ref 22–32)
CO2: 22 MMOL/L (ref 22–32)
CO2: 24 MMOL/L (ref 22–32)
CO2: 25 MMOL/L (ref 22–32)
CO2: 26 MMOL/L (ref 22–32)
CO2: 27 MMOL/L (ref 22–32)
CO2: 28 MMOL/L (ref 22–32)
CO2: 29 MMOL/L (ref 22–32)
CO2: 31 MMOL/L (ref 22–32)
COLOR UR AUTO: YELLOW
CORTISOL: 35.7 UG/DL
CREAT BLD-MCNC: 1.67 MG/DL (ref 0.7–1.3)
CREAT BLD-MCNC: 1.68 MG/DL (ref 0.7–1.3)
CREAT BLD-MCNC: 1.73 MG/DL (ref 0.7–1.3)
CREAT BLD-MCNC: 1.73 MG/DL (ref 0.7–1.3)
CREAT BLD-MCNC: 1.74 MG/DL (ref 0.7–1.3)
CREAT BLD-MCNC: 1.77 MG/DL (ref 0.7–1.3)
CREAT BLD-MCNC: 1.83 MG/DL (ref 0.7–1.3)
CREAT BLD-MCNC: 1.84 MG/DL (ref 0.7–1.3)
CREAT BLD-MCNC: 1.88 MG/DL (ref 0.7–1.3)
CREAT BLD-MCNC: 1.9 MG/DL (ref 0.7–1.3)
CREAT BLD-MCNC: 1.92 MG/DL (ref 0.7–1.3)
CREAT BLD-MCNC: 1.96 MG/DL (ref 0.7–1.3)
CREAT BLD-MCNC: 1.97 MG/DL (ref 0.7–1.3)
CREAT BLD-MCNC: 1.98 MG/DL (ref 0.7–1.3)
CREAT BLD-MCNC: 1.99 MG/DL (ref 0.7–1.3)
CREAT BLD-MCNC: 2 MG/DL (ref 0.7–1.3)
CREAT BLD-MCNC: 2.11 MG/DL (ref 0.7–1.3)
CREAT BLD-MCNC: 2.15 MG/DL (ref 0.7–1.3)
CREAT BLD-MCNC: 2.23 MG/DL (ref 0.7–1.3)
CREAT BLD-MCNC: 2.52 MG/DL (ref 0.7–1.3)
CREAT BLD-MCNC: 2.72 MG/DL (ref 0.7–1.3)
CREAT BLD-MCNC: 3.29 MG/DL (ref 0.7–1.3)
CREAT BLD-MCNC: 3.35 MG/DL (ref 0.7–1.3)
CREAT BLD-MCNC: 3.77 MG/DL (ref 0.7–1.3)
CREAT BLD-MCNC: 3.79 MG/DL (ref 0.7–1.3)
CREAT BLD-MCNC: 3.91 MG/DL (ref 0.7–1.3)
CREAT BLD-MCNC: 3.92 MG/DL (ref 0.7–1.3)
CREAT UR-SCNC: 129 MG/DL
CREATININE, SERUM: 1.6 MG/DL
CREATININE, SERUM: 1.83 MG/DL
DEPRECATED HBV CORE AB SER IA-ACNC: 1818.9 NG/ML (ref 22–322)
EOSINOPHIL # BLD AUTO: 0 X10(3) UL (ref 0–0.3)
EOSINOPHIL # BLD AUTO: 0.01 X10(3) UL (ref 0–0.3)
EOSINOPHIL # BLD AUTO: 0.01 X10(3) UL (ref 0–0.3)
EOSINOPHIL # BLD AUTO: 0.06 X10(3) UL (ref 0–0.3)
EOSINOPHIL # BLD AUTO: 0.06 X10(3) UL (ref 0–0.3)
EOSINOPHIL # BLD AUTO: 0.07 X10(3) UL (ref 0–0.3)
EOSINOPHIL # BLD AUTO: 0.08 X10(3) UL (ref 0–0.3)
EOSINOPHIL # BLD AUTO: 0.1 X10(3) UL (ref 0–0.3)
EOSINOPHIL # BLD AUTO: 0.18 X10(3) UL (ref 0–0.3)
EOSINOPHIL # BLD AUTO: 0.24 X10(3) UL (ref 0–0.3)
EOSINOPHIL # BLD AUTO: 0.25 X10(3) UL (ref 0–0.3)
EOSINOPHIL # BLD AUTO: 0.26 X10(3) UL (ref 0–0.3)
EOSINOPHIL % MANUAL: 2 %
EOSINOPHIL ABSOLUTE MANUAL: 0.13 X10(3) UL (ref 0–0.3)
EOSINOPHIL NFR BLD AUTO: 0 %
EOSINOPHIL NFR BLD AUTO: 0.1 %
EOSINOPHIL NFR BLD AUTO: 0.2 %
EOSINOPHIL NFR BLD AUTO: 0.6 %
EOSINOPHIL NFR BLD AUTO: 0.8 %
EOSINOPHIL NFR BLD AUTO: 1.1 %
EOSINOPHIL NFR BLD AUTO: 1.8 %
EOSINOPHIL NFR BLD AUTO: 1.9 %
EOSINOPHIL NFR BLD AUTO: 11.5 %
EOSINOPHIL NFR BLD AUTO: 2.6 %
EOSINOPHIL NFR BLD AUTO: 3.1 %
EOSINOPHIL NFR BLD AUTO: 3.5 %
ERYTHROCYTE [DISTWIDTH] IN BLOOD BY AUTOMATED COUNT: 13.1 % (ref 11.5–16)
ERYTHROCYTE [DISTWIDTH] IN BLOOD BY AUTOMATED COUNT: 13.1 % (ref 11.5–16)
ERYTHROCYTE [DISTWIDTH] IN BLOOD BY AUTOMATED COUNT: 13.3 % (ref 11.5–16)
ERYTHROCYTE [DISTWIDTH] IN BLOOD BY AUTOMATED COUNT: 13.8 % (ref 11.5–16)
ERYTHROCYTE [DISTWIDTH] IN BLOOD BY AUTOMATED COUNT: 13.8 % (ref 11.5–16)
ERYTHROCYTE [DISTWIDTH] IN BLOOD BY AUTOMATED COUNT: 14.6 % (ref 11.5–16)
ERYTHROCYTE [DISTWIDTH] IN BLOOD BY AUTOMATED COUNT: 15.1 % (ref 11.5–16)
ERYTHROCYTE [DISTWIDTH] IN BLOOD BY AUTOMATED COUNT: 17.5 % (ref 11.5–16)
ERYTHROCYTE [DISTWIDTH] IN BLOOD BY AUTOMATED COUNT: 17.7 % (ref 11.5–16)
ERYTHROCYTE [DISTWIDTH] IN BLOOD BY AUTOMATED COUNT: 18 % (ref 11.5–16)
ERYTHROCYTE [DISTWIDTH] IN BLOOD BY AUTOMATED COUNT: 18 % (ref 11.5–16)
ERYTHROCYTE [DISTWIDTH] IN BLOOD BY AUTOMATED COUNT: 18.1 % (ref 11.5–16)
ERYTHROCYTE [DISTWIDTH] IN BLOOD BY AUTOMATED COUNT: 18.1 % (ref 11.5–16)
ERYTHROCYTE [DISTWIDTH] IN BLOOD BY AUTOMATED COUNT: 18.2 % (ref 11.5–16)
ERYTHROCYTE [DISTWIDTH] IN BLOOD BY AUTOMATED COUNT: 18.4 % (ref 11.5–16)
ERYTHROCYTE [DISTWIDTH] IN BLOOD BY AUTOMATED COUNT: 19.3 % (ref 11.5–16)
ERYTHROCYTE [DISTWIDTH] IN BLOOD BY AUTOMATED COUNT: 20.4 % (ref 11.5–16)
EST. AVERAGE GLUCOSE BLD GHB EST-MCNC: 140 MG/DL (ref 68–126)
EST. AVERAGE GLUCOSE BLD GHB EST-MCNC: 143 MG/DL (ref 68–126)
EXPIRATORY PRESSURE: 5 CM H2O
EXPIRATORY PRESSURE: 5 CM H2O
FIO2: 35 %
FIO2: 40 %
FIO2: 40 %
FIO2: 60 %
GLUCOSE BLD-MCNC: 117 MG/DL (ref 70–99)
GLUCOSE BLD-MCNC: 119 MG/DL (ref 70–99)
GLUCOSE BLD-MCNC: 123 MG/DL (ref 65–99)
GLUCOSE BLD-MCNC: 124 MG/DL (ref 65–99)
GLUCOSE BLD-MCNC: 125 MG/DL (ref 65–99)
GLUCOSE BLD-MCNC: 127 MG/DL (ref 65–99)
GLUCOSE BLD-MCNC: 128 MG/DL (ref 65–99)
GLUCOSE BLD-MCNC: 133 MG/DL (ref 65–99)
GLUCOSE BLD-MCNC: 134 MG/DL (ref 65–99)
GLUCOSE BLD-MCNC: 135 MG/DL (ref 65–99)
GLUCOSE BLD-MCNC: 136 MG/DL (ref 70–99)
GLUCOSE BLD-MCNC: 138 MG/DL (ref 65–99)
GLUCOSE BLD-MCNC: 142 MG/DL (ref 70–99)
GLUCOSE BLD-MCNC: 146 MG/DL (ref 65–99)
GLUCOSE BLD-MCNC: 146 MG/DL (ref 65–99)
GLUCOSE BLD-MCNC: 148 MG/DL (ref 65–99)
GLUCOSE BLD-MCNC: 154 MG/DL (ref 65–99)
GLUCOSE BLD-MCNC: 155 MG/DL (ref 65–99)
GLUCOSE BLD-MCNC: 156 MG/DL (ref 65–99)
GLUCOSE BLD-MCNC: 156 MG/DL (ref 65–99)
GLUCOSE BLD-MCNC: 157 MG/DL (ref 70–99)
GLUCOSE BLD-MCNC: 158 MG/DL (ref 65–99)
GLUCOSE BLD-MCNC: 159 MG/DL (ref 65–99)
GLUCOSE BLD-MCNC: 161 MG/DL (ref 65–99)
GLUCOSE BLD-MCNC: 165 MG/DL (ref 65–99)
GLUCOSE BLD-MCNC: 166 MG/DL (ref 65–99)
GLUCOSE BLD-MCNC: 166 MG/DL (ref 65–99)
GLUCOSE BLD-MCNC: 168 MG/DL (ref 70–99)
GLUCOSE BLD-MCNC: 170 MG/DL (ref 65–99)
GLUCOSE BLD-MCNC: 171 MG/DL (ref 70–99)
GLUCOSE BLD-MCNC: 173 MG/DL (ref 65–99)
GLUCOSE BLD-MCNC: 175 MG/DL (ref 65–99)
GLUCOSE BLD-MCNC: 178 MG/DL (ref 65–99)
GLUCOSE BLD-MCNC: 179 MG/DL (ref 70–99)
GLUCOSE BLD-MCNC: 180 MG/DL (ref 70–99)
GLUCOSE BLD-MCNC: 181 MG/DL (ref 65–99)
GLUCOSE BLD-MCNC: 182 MG/DL (ref 65–99)
GLUCOSE BLD-MCNC: 186 MG/DL (ref 65–99)
GLUCOSE BLD-MCNC: 186 MG/DL (ref 70–99)
GLUCOSE BLD-MCNC: 188 MG/DL (ref 70–99)
GLUCOSE BLD-MCNC: 190 MG/DL (ref 65–99)
GLUCOSE BLD-MCNC: 191 MG/DL (ref 65–99)
GLUCOSE BLD-MCNC: 193 MG/DL (ref 70–99)
GLUCOSE BLD-MCNC: 194 MG/DL (ref 65–99)
GLUCOSE BLD-MCNC: 196 MG/DL (ref 65–99)
GLUCOSE BLD-MCNC: 196 MG/DL (ref 65–99)
GLUCOSE BLD-MCNC: 197 MG/DL (ref 65–99)
GLUCOSE BLD-MCNC: 198 MG/DL (ref 65–99)
GLUCOSE BLD-MCNC: 198 MG/DL (ref 65–99)
GLUCOSE BLD-MCNC: 198 MG/DL (ref 70–99)
GLUCOSE BLD-MCNC: 199 MG/DL (ref 70–99)
GLUCOSE BLD-MCNC: 203 MG/DL (ref 70–99)
GLUCOSE BLD-MCNC: 203 MG/DL (ref 70–99)
GLUCOSE BLD-MCNC: 205 MG/DL (ref 70–99)
GLUCOSE BLD-MCNC: 208 MG/DL (ref 65–99)
GLUCOSE BLD-MCNC: 209 MG/DL (ref 65–99)
GLUCOSE BLD-MCNC: 209 MG/DL (ref 70–99)
GLUCOSE BLD-MCNC: 211 MG/DL (ref 70–99)
GLUCOSE BLD-MCNC: 214 MG/DL (ref 65–99)
GLUCOSE BLD-MCNC: 216 MG/DL (ref 70–99)
GLUCOSE BLD-MCNC: 217 MG/DL (ref 70–99)
GLUCOSE BLD-MCNC: 218 MG/DL (ref 70–99)
GLUCOSE BLD-MCNC: 219 MG/DL (ref 70–99)
GLUCOSE BLD-MCNC: 223 MG/DL (ref 70–99)
GLUCOSE BLD-MCNC: 228 MG/DL (ref 65–99)
GLUCOSE BLD-MCNC: 229 MG/DL (ref 65–99)
GLUCOSE BLD-MCNC: 232 MG/DL (ref 65–99)
GLUCOSE BLD-MCNC: 234 MG/DL (ref 70–99)
GLUCOSE BLD-MCNC: 242 MG/DL (ref 65–99)
GLUCOSE BLD-MCNC: 243 MG/DL (ref 65–99)
GLUCOSE BLD-MCNC: 243 MG/DL (ref 70–99)
GLUCOSE BLD-MCNC: 254 MG/DL (ref 65–99)
GLUCOSE BLD-MCNC: 255 MG/DL (ref 65–99)
GLUCOSE BLD-MCNC: 256 MG/DL (ref 65–99)
GLUCOSE BLD-MCNC: 258 MG/DL (ref 65–99)
GLUCOSE BLD-MCNC: 261 MG/DL (ref 65–99)
GLUCOSE BLD-MCNC: 262 MG/DL (ref 65–99)
GLUCOSE BLD-MCNC: 263 MG/DL (ref 65–99)
GLUCOSE BLD-MCNC: 298 MG/DL (ref 65–99)
GLUCOSE BLD-MCNC: 308 MG/DL (ref 65–99)
GLUCOSE BLD-MCNC: 350 MG/DL (ref 65–99)
GLUCOSE BLD-MCNC: 368 MG/DL (ref 65–99)
GLUCOSE BLD-MCNC: 377 MG/DL (ref 65–99)
GLUCOSE BLD-MCNC: 386 MG/DL (ref 65–99)
GLUCOSE BLD-MCNC: 386 MG/DL (ref 70–99)
GLUCOSE UR STRIP.AUTO-MCNC: NEGATIVE MG/DL
GLUCOSE UR STRIP.AUTO-MCNC: NEGATIVE MG/DL
GLUCOSE: 150 MG/DL
GLUCOSE: 243 MG/DL
HAV IGM SER QL: 2 MG/DL (ref 1.7–3)
HAV IGM SER QL: 2.2 MG/DL (ref 1.7–3)
HAV IGM SER QL: 2.3 MG/DL (ref 1.7–3)
HAV IGM SER QL: 2.3 MG/DL (ref 1.7–3)
HAV IGM SER QL: 2.4 MG/DL (ref 1.7–3)
HBA1C MFR BLD HPLC: 6.5 % (ref ?–5.7)
HBA1C MFR BLD HPLC: 6.6 % (ref ?–5.7)
HCT VFR BLD AUTO: 21.3 % (ref 37–53)
HCT VFR BLD AUTO: 22.6 % (ref 37–53)
HCT VFR BLD AUTO: 25.5 % (ref 37–53)
HCT VFR BLD AUTO: 25.6 % (ref 37–53)
HCT VFR BLD AUTO: 28 % (ref 37–53)
HCT VFR BLD AUTO: 29 % (ref 37–53)
HCT VFR BLD AUTO: 29.3 % (ref 37–53)
HCT VFR BLD AUTO: 29.5 % (ref 37–53)
HCT VFR BLD AUTO: 31.1 % (ref 37–53)
HCT VFR BLD AUTO: 31.5 % (ref 37–53)
HCT VFR BLD AUTO: 31.9 % (ref 37–53)
HCT VFR BLD AUTO: 32.5 % (ref 37–53)
HCT VFR BLD AUTO: 33.8 % (ref 37–53)
HCT VFR BLD AUTO: 33.8 % (ref 37–53)
HCT VFR BLD AUTO: 34.7 % (ref 37–53)
HCT VFR BLD AUTO: 35.6 % (ref 37–53)
HCT VFR BLD AUTO: 37 % (ref 37–53)
HDLC SERPL-MCNC: 44 MG/DL (ref 45–?)
HDLC SERPL: 2.09 {RATIO} (ref ?–4.97)
HEMATOCRIT: 31.1 %
HEMATOCRIT: 33.6 %
HEMOGLOBIN: 10.4 G/DL
HEMOGLOBIN: 11.2 G/DL
HGB BLD-MCNC: 10 G/DL (ref 13–17)
HGB BLD-MCNC: 10.1 G/DL (ref 13–17)
HGB BLD-MCNC: 10.2 G/DL (ref 13–17)
HGB BLD-MCNC: 10.4 G/DL (ref 13–17)
HGB BLD-MCNC: 10.4 G/DL (ref 13–17)
HGB BLD-MCNC: 10.9 G/DL (ref 13–17)
HGB BLD-MCNC: 11.1 G/DL (ref 13–17)
HGB BLD-MCNC: 11.1 G/DL (ref 13–17)
HGB BLD-MCNC: 11.3 G/DL (ref 13–17)
HGB BLD-MCNC: 11.3 G/DL (ref 13–17)
HGB BLD-MCNC: 12.3 G/DL (ref 13–17)
HGB BLD-MCNC: 7.3 G/DL (ref 13–17)
HGB BLD-MCNC: 7.5 G/DL (ref 13–17)
HGB BLD-MCNC: 8.4 G/DL (ref 13–17)
HGB BLD-MCNC: 8.6 G/DL (ref 13–17)
HGB BLD-MCNC: 9.3 G/DL (ref 13–17)
HGB BLD-MCNC: 9.5 G/DL (ref 13–17)
HYALINE CASTS #/AREA URNS AUTO: PRESENT /LPF
IMMATURE GRANULOCYTE COUNT: 0 X10(3) UL (ref 0–1)
IMMATURE GRANULOCYTE COUNT: 0 X10(3) UL (ref 0–1)
IMMATURE GRANULOCYTE COUNT: 0.03 X10(3) UL (ref 0–1)
IMMATURE GRANULOCYTE COUNT: 0.04 X10(3) UL (ref 0–1)
IMMATURE GRANULOCYTE COUNT: 0.04 X10(3) UL (ref 0–1)
IMMATURE GRANULOCYTE COUNT: 0.05 X10(3) UL (ref 0–1)
IMMATURE GRANULOCYTE COUNT: 0.05 X10(3) UL (ref 0–1)
IMMATURE GRANULOCYTE COUNT: 0.06 X10(3) UL (ref 0–1)
IMMATURE GRANULOCYTE COUNT: 0.08 X10(3) UL (ref 0–1)
IMMATURE GRANULOCYTE COUNT: 0.1 X10(3) UL (ref 0–1)
IMMATURE GRANULOCYTE COUNT: 0.1 X10(3) UL (ref 0–1)
IMMATURE GRANULOCYTE COUNT: 0.14 X10(3) UL (ref 0–1)
IMMATURE GRANULOCYTE COUNT: 0.22 X10(3) UL (ref 0–1)
IMMATURE GRANULOCYTE COUNT: 0.29 X10(3) UL (ref 0–1)
IMMATURE GRANULOCYTE RATIO %: 0 %
IMMATURE GRANULOCYTE RATIO %: 0 %
IMMATURE GRANULOCYTE RATIO %: 0.5 %
IMMATURE GRANULOCYTE RATIO %: 0.6 %
IMMATURE GRANULOCYTE RATIO %: 0.7 %
IMMATURE GRANULOCYTE RATIO %: 0.7 %
IMMATURE GRANULOCYTE RATIO %: 0.8 %
IMMATURE GRANULOCYTE RATIO %: 0.9 %
IMMATURE GRANULOCYTE RATIO %: 0.9 %
IMMATURE GRANULOCYTE RATIO %: 1.2 %
IMMATURE GRANULOCYTE RATIO %: 1.3 %
IMMATURE GRANULOCYTE RATIO %: 1.4 %
IMMATURE GRANULOCYTE RATIO %: 1.4 %
INR BLD: 1.11 (ref 0.89–1.11)
INSP PRESSURE: 14 CM H2O
INSPIRATORY TIME: 1 %
IONIZED CALCIUM: 1.11 MMOL/L (ref 1.12–1.32)
IONIZED CALCIUM: 1.14 MMOL/L (ref 1.12–1.32)
IONIZED CALCIUM: 1.18 MMOL/L (ref 1.12–1.32)
IONIZED CALCIUM: 1.19 MMOL/L (ref 1.12–1.32)
IRON SATURATION: 34 % (ref 13–45)
IRON: 64 UG/DL (ref 45–182)
KETONES UR STRIP.AUTO-MCNC: NEGATIVE MG/DL
KETONES UR STRIP.AUTO-MCNC: NEGATIVE MG/DL
L/M: 3 L/MIN
L/M: 4 L/MIN
LACTIC ACID ARTERIAL: 1.6 MMOL/L (ref 0.5–2)
LACTIC ACID ARTERIAL: 1.6 MMOL/L (ref 0.5–2)
LACTIC ACID ARTERIAL: 1.9 MMOL/L (ref 0.5–2)
LACTIC ACID ARTERIAL: <1.3 MMOL/L (ref 0.5–2)
LACTIC ACID: 1.9 MMOL/L (ref 0.5–2)
LDH: 149 U/L (ref 84–249)
LDH: 184 IU/L
LDLC SERPL CALC-MCNC: 30 MG/DL (ref ?–130)
LDLC SERPL-MCNC: 18 MG/DL (ref 5–40)
LEUKOCYTE ESTERASE UR QL STRIP.AUTO: NEGATIVE
LYMPHOCYTE % MANUAL: 5 %
LYMPHOCYTE ABSOLUTE MANUAL: 0.33 X10(3) UL (ref 0.9–4)
LYMPHOCYTES # BLD AUTO: 0.16 X10(3) UL (ref 0.9–4)
LYMPHOCYTES # BLD AUTO: 0.17 X10(3) UL (ref 0.9–4)
LYMPHOCYTES # BLD AUTO: 0.25 X10(3) UL (ref 0.9–4)
LYMPHOCYTES # BLD AUTO: 0.26 X10(3) UL (ref 0.9–4)
LYMPHOCYTES # BLD AUTO: 0.27 X10(3) UL (ref 0.9–4)
LYMPHOCYTES # BLD AUTO: 0.31 X10(3) UL (ref 0.9–4)
LYMPHOCYTES # BLD AUTO: 0.32 X10(3) UL (ref 0.9–4)
LYMPHOCYTES # BLD AUTO: 0.34 X10(3) UL (ref 0.9–4)
LYMPHOCYTES # BLD AUTO: 0.47 X10(3) UL (ref 0.9–4)
LYMPHOCYTES # BLD AUTO: 0.47 X10(3) UL (ref 0.9–4)
LYMPHOCYTES # BLD AUTO: 0.48 X10(3) UL (ref 0.9–4)
LYMPHOCYTES # BLD AUTO: 0.53 X10(3) UL (ref 0.9–4)
LYMPHOCYTES # BLD AUTO: 0.6 X10(3) UL (ref 0.9–4)
LYMPHOCYTES # BLD AUTO: 0.62 X10(3) UL (ref 0.9–4)
LYMPHOCYTES # BLD AUTO: 0.62 X10(3) UL (ref 0.9–4)
LYMPHOCYTES # BLD AUTO: 0.73 X10(3) UL (ref 0.9–4)
LYMPHOCYTES NFR BLD AUTO: 0.8 %
LYMPHOCYTES NFR BLD AUTO: 1.4 %
LYMPHOCYTES NFR BLD AUTO: 1.5 %
LYMPHOCYTES NFR BLD AUTO: 10.5 %
LYMPHOCYTES NFR BLD AUTO: 12.4 %
LYMPHOCYTES NFR BLD AUTO: 2.3 %
LYMPHOCYTES NFR BLD AUTO: 20.8 %
LYMPHOCYTES NFR BLD AUTO: 31 %
LYMPHOCYTES NFR BLD AUTO: 4.9 %
LYMPHOCYTES NFR BLD AUTO: 5.5 %
LYMPHOCYTES NFR BLD AUTO: 5.8 %
LYMPHOCYTES NFR BLD AUTO: 6.9 %
LYMPHOCYTES NFR BLD AUTO: 8.2 %
LYMPHOCYTES NFR BLD AUTO: 8.3 %
LYMPHOCYTES NFR BLD AUTO: 8.3 %
LYMPHOCYTES NFR BLD AUTO: 8.6 %
M PROTEIN MFR SERPL ELPH: 5.5 G/DL (ref 6.1–8.3)
M PROTEIN MFR SERPL ELPH: 5.5 G/DL (ref 6.1–8.3)
M PROTEIN MFR SERPL ELPH: 5.8 G/DL (ref 6.1–8.3)
M PROTEIN MFR SERPL ELPH: 6.1 G/DL (ref 6.1–8.3)
M PROTEIN MFR SERPL ELPH: 6.2 G/DL (ref 6.1–8.3)
M PROTEIN MFR SERPL ELPH: 6.3 G/DL (ref 6.1–8.3)
M PROTEIN MFR SERPL ELPH: 6.4 G/DL (ref 6.1–8.3)
M PROTEIN MFR SERPL ELPH: 6.6 G/DL (ref 6.1–8.3)
M PROTEIN MFR SERPL ELPH: 6.6 G/DL (ref 6.1–8.3)
M PROTEIN MFR SERPL ELPH: 6.8 G/DL (ref 6.1–8.3)
MCH RBC QN AUTO: 32.4 PG (ref 27–33.2)
MCH RBC QN AUTO: 32.5 PG (ref 27–33.2)
MCH RBC QN AUTO: 32.7 PG (ref 27–33.2)
MCH RBC QN AUTO: 33 PG (ref 27–33.2)
MCH RBC QN AUTO: 33.2 PG (ref 27–33.2)
MCH RBC QN AUTO: 33.5 PG (ref 27–33.2)
MCH RBC QN AUTO: 33.5 PG (ref 27–33.2)
MCH RBC QN AUTO: 33.6 PG (ref 27–33.2)
MCH RBC QN AUTO: 33.6 PG (ref 27–33.2)
MCH RBC QN AUTO: 33.7 PG (ref 27–33.2)
MCH RBC QN AUTO: 34.2 PG (ref 27–33.2)
MCH RBC QN AUTO: 34.3 PG (ref 27–33.2)
MCH RBC QN AUTO: 34.6 PG (ref 27–33.2)
MCH RBC QN AUTO: 34.8 PG (ref 27–33.2)
MCH RBC QN AUTO: 34.8 PG (ref 27–33.2)
MCH RBC QN AUTO: 34.9 PG (ref 27–33.2)
MCH RBC QN AUTO: 37.1 PG (ref 27–33.2)
MCHC RBC AUTO-ENTMCNC: 31.7 G/DL (ref 31–37)
MCHC RBC AUTO-ENTMCNC: 31.7 G/DL (ref 31–37)
MCHC RBC AUTO-ENTMCNC: 32.4 G/DL (ref 31–37)
MCHC RBC AUTO-ENTMCNC: 32.6 G/DL (ref 31–37)
MCHC RBC AUTO-ENTMCNC: 32.6 G/DL (ref 31–37)
MCHC RBC AUTO-ENTMCNC: 32.8 G/DL (ref 31–37)
MCHC RBC AUTO-ENTMCNC: 33.2 G/DL (ref 31–37)
MCHC RBC AUTO-ENTMCNC: 33.2 G/DL (ref 31–37)
MCHC RBC AUTO-ENTMCNC: 33.4 G/DL (ref 31–37)
MCHC RBC AUTO-ENTMCNC: 33.5 G/DL (ref 31–37)
MCHC RBC AUTO-ENTMCNC: 33.7 G/DL (ref 31–37)
MCHC RBC AUTO-ENTMCNC: 33.9 G/DL (ref 31–37)
MCHC RBC AUTO-ENTMCNC: 34.2 G/DL (ref 31–37)
MCHC RBC AUTO-ENTMCNC: 34.3 G/DL (ref 31–37)
MCHC RBC AUTO-ENTMCNC: 34.5 G/DL (ref 31–37)
MCV RBC AUTO: 100 FL (ref 80–99)
MCV RBC AUTO: 100.3 FL (ref 80–99)
MCV RBC AUTO: 101.1 FL (ref 80–99)
MCV RBC AUTO: 101.2 FL (ref 80–99)
MCV RBC AUTO: 101.3 FL (ref 80–99)
MCV RBC AUTO: 101.5 FL (ref 80–99)
MCV RBC AUTO: 101.7 FL (ref 80–99)
MCV RBC AUTO: 101.9 FL (ref 80–99)
MCV RBC AUTO: 102.1 FL (ref 80–99)
MCV RBC AUTO: 102.4 FL (ref 80–99)
MCV RBC AUTO: 102.9 FL (ref 80–99)
MCV RBC AUTO: 102.9 FL (ref 80–99)
MCV RBC AUTO: 105.3 FL (ref 80–99)
MCV RBC AUTO: 107.5 FL (ref 80–99)
MCV RBC AUTO: 111.1 FL (ref 80–99)
MCV RBC AUTO: 97.7 FL (ref 80–99)
MCV RBC AUTO: 98.8 FL (ref 80–99)
METAMYELOCYTE %: 3 %
METAMYELOCYTE ABSOLUTE MANUAL: 0.2 X10(3) UL (ref ?–0.01)
METHEMOGLOBIN: 0.5 % SAT (ref 0.4–1.5)
METHEMOGLOBIN: 0.6 % SAT (ref 0.4–1.5)
METHEMOGLOBIN: 0.7 % SAT (ref 0.4–1.5)
METHEMOGLOBIN: 0.7 % SAT (ref 0.4–1.5)
MONOCYTE % MANUAL: 25 %
MONOCYTE ABSOLUTE MANUAL: 1.65 X10(3) UL (ref 0.1–0.6)
MONOCYTES # BLD AUTO: 0.02 X10(3) UL (ref 0.1–0.6)
MONOCYTES # BLD AUTO: 0.04 X10(3) UL (ref 0.1–0.6)
MONOCYTES # BLD AUTO: 0.09 X10(3) UL (ref 0.1–0.6)
MONOCYTES # BLD AUTO: 0.17 X10(3) UL (ref 0.1–0.6)
MONOCYTES # BLD AUTO: 0.41 X10(3) UL (ref 0.1–0.6)
MONOCYTES # BLD AUTO: 0.46 X10(3) UL (ref 0.1–0.6)
MONOCYTES # BLD AUTO: 0.54 X10(3) UL (ref 0.1–0.6)
MONOCYTES # BLD AUTO: 0.65 X10(3) UL (ref 0.1–0.6)
MONOCYTES # BLD AUTO: 0.66 X10(3) UL (ref 0.1–0.6)
MONOCYTES # BLD AUTO: 0.74 X10(3) UL (ref 0.1–0.6)
MONOCYTES # BLD AUTO: 0.82 X10(3) UL (ref 0.1–0.6)
MONOCYTES # BLD AUTO: 0.86 X10(3) UL (ref 0.1–0.6)
MONOCYTES # BLD AUTO: 0.94 X10(3) UL (ref 0.1–0.6)
MONOCYTES # BLD AUTO: 1.07 X10(3) UL (ref 0.1–0.6)
MONOCYTES # BLD AUTO: 1.12 X10(3) UL (ref 0.1–0.6)
MONOCYTES # BLD AUTO: 1.38 X10(3) UL (ref 0.1–0.6)
MONOCYTES NFR BLD AUTO: 0.7 %
MONOCYTES NFR BLD AUTO: 10.3 %
MONOCYTES NFR BLD AUTO: 13.1 %
MONOCYTES NFR BLD AUTO: 14.7 %
MONOCYTES NFR BLD AUTO: 2.6 %
MONOCYTES NFR BLD AUTO: 2.9 %
MONOCYTES NFR BLD AUTO: 4.5 %
MONOCYTES NFR BLD AUTO: 6.6 %
MONOCYTES NFR BLD AUTO: 6.6 %
MONOCYTES NFR BLD AUTO: 6.9 %
MONOCYTES NFR BLD AUTO: 7.5 %
MONOCYTES NFR BLD AUTO: 7.6 %
MONOCYTES NFR BLD AUTO: 7.7 %
MONOCYTES NFR BLD AUTO: 8.5 %
MONOCYTES NFR BLD AUTO: 8.7 %
MONOCYTES NFR BLD AUTO: 9.6 %
MYELOCYTE %: 7 %
MYELOCYTE ABSOLUTE MANUAL: 0.46 X10(3) UL (ref ?–0.01)
NEUTROPHIL ABS PRELIM: 0.41 X10 (3) UL (ref 1.3–6.7)
NEUTROPHIL ABS PRELIM: 0.59 X10 (3) UL (ref 1.3–6.7)
NEUTROPHIL ABS PRELIM: 12.34 X10 (3) UL (ref 1.3–6.7)
NEUTROPHIL ABS PRELIM: 14.67 X10 (3) UL (ref 1.3–6.7)
NEUTROPHIL ABS PRELIM: 16.08 X10 (3) UL (ref 1.3–6.7)
NEUTROPHIL ABS PRELIM: 2.16 X10 (3) UL (ref 1.3–6.7)
NEUTROPHIL ABS PRELIM: 21.48 X10 (3) UL (ref 1.3–6.7)
NEUTROPHIL ABS PRELIM: 3.04 X10 (3) UL (ref 1.3–6.7)
NEUTROPHIL ABS PRELIM: 4.42 X10 (3) UL (ref 1.3–6.7)
NEUTROPHIL ABS PRELIM: 4.98 X10 (3) UL (ref 1.3–6.7)
NEUTROPHIL ABS PRELIM: 5.44 X10 (3) UL (ref 1.3–6.7)
NEUTROPHIL ABS PRELIM: 5.47 X10 (3) UL (ref 1.3–6.7)
NEUTROPHIL ABS PRELIM: 5.52 X10 (3) UL (ref 1.3–6.7)
NEUTROPHIL ABS PRELIM: 5.85 X10 (3) UL (ref 1.3–6.7)
NEUTROPHIL ABS PRELIM: 5.88 X10 (3) UL (ref 1.3–6.7)
NEUTROPHIL ABS PRELIM: 6.08 X10 (3) UL (ref 1.3–6.7)
NEUTROPHIL ABS PRELIM: 8.22 X10 (3) UL (ref 1.3–6.7)
NEUTROPHIL ABSOLUTE MANUAL: 3.83 X10(3) UL (ref 1.3–6.7)
NEUTROPHILS # BLD AUTO: 0.41 X10(3) UL (ref 1.3–6.7)
NEUTROPHILS # BLD AUTO: 0.59 X10(3) UL (ref 1.3–6.7)
NEUTROPHILS # BLD AUTO: 12.34 X10(3) UL (ref 1.3–6.7)
NEUTROPHILS # BLD AUTO: 14.67 X10(3) UL (ref 1.3–6.7)
NEUTROPHILS # BLD AUTO: 16.08 X10(3) UL (ref 1.3–6.7)
NEUTROPHILS # BLD AUTO: 21.48 X10(3) UL (ref 1.3–6.7)
NEUTROPHILS # BLD AUTO: 3.04 X10(3) UL (ref 1.3–6.7)
NEUTROPHILS # BLD AUTO: 4.42 X10(3) UL (ref 1.3–6.7)
NEUTROPHILS # BLD AUTO: 4.98 X10(3) UL (ref 1.3–6.7)
NEUTROPHILS # BLD AUTO: 5.44 X10(3) UL (ref 1.3–6.7)
NEUTROPHILS # BLD AUTO: 5.47 X10(3) UL (ref 1.3–6.7)
NEUTROPHILS # BLD AUTO: 5.52 X10(3) UL (ref 1.3–6.7)
NEUTROPHILS # BLD AUTO: 5.85 X10(3) UL (ref 1.3–6.7)
NEUTROPHILS # BLD AUTO: 5.88 X10(3) UL (ref 1.3–6.7)
NEUTROPHILS # BLD AUTO: 6.08 X10(3) UL (ref 1.3–6.7)
NEUTROPHILS # BLD AUTO: 8.22 X10(3) UL (ref 1.3–6.7)
NEUTROPHILS % MANUAL: 52 %
NEUTROPHILS NFR BLD AUTO: 47.2 %
NEUTROPHILS NFR BLD AUTO: 75.2 %
NEUTROPHILS NFR BLD AUTO: 76.6 %
NEUTROPHILS NFR BLD AUTO: 76.8 %
NEUTROPHILS NFR BLD AUTO: 78.2 %
NEUTROPHILS NFR BLD AUTO: 79.1 %
NEUTROPHILS NFR BLD AUTO: 79.1 %
NEUTROPHILS NFR BLD AUTO: 80.4 %
NEUTROPHILS NFR BLD AUTO: 81 %
NEUTROPHILS NFR BLD AUTO: 85.1 %
NEUTROPHILS NFR BLD AUTO: 87.2 %
NEUTROPHILS NFR BLD AUTO: 87.3 %
NEUTROPHILS NFR BLD AUTO: 89.5 %
NEUTROPHILS NFR BLD AUTO: 90.6 %
NEUTROPHILS NFR BLD AUTO: 92.9 %
NEUTROPHILS NFR BLD AUTO: 94.8 %
NITRITE UR QL STRIP.AUTO: NEGATIVE
NITRITE UR QL STRIP.AUTO: NEGATIVE
NONHDLC SERPL-MCNC: 48 MG/DL (ref ?–130)
OSMOLALITY URINE: 305 MOSM/KG (ref 300–1300)
PATIENT TEMPERATURE: 98.6 F
PATIENT TEMPERATURE: 98.8 F
PATIENT TEMPERATURE: 98.8 F
PATIENT TEMPERATURE: 99.4 F
PEEP: 5 CM H2O
PH UR STRIP.AUTO: 5 [PH] (ref 4.5–8)
PH UR STRIP.AUTO: 5.5 [PH] (ref 4.5–8)
PHOSPHATE SERPL-MCNC: 4.7 MG/DL (ref 2.5–4.9)
PHOSPHATE SERPL-MCNC: 5.4 MG/DL (ref 2.5–4.9)
PHOSPHATE SERPL-MCNC: 7.5 MG/DL (ref 2.5–4.9)
PLATELET # BLD AUTO: 102 10(3)UL (ref 150–450)
PLATELET # BLD AUTO: 130 10(3)UL (ref 150–450)
PLATELET # BLD AUTO: 135 10(3)UL (ref 150–450)
PLATELET # BLD AUTO: 147 10(3)UL (ref 150–450)
PLATELET # BLD AUTO: 157 10(3)UL (ref 150–450)
PLATELET # BLD AUTO: 157 10(3)UL (ref 150–450)
PLATELET # BLD AUTO: 161 10(3)UL (ref 150–450)
PLATELET # BLD AUTO: 164 10(3)UL (ref 150–450)
PLATELET # BLD AUTO: 167 10(3)UL (ref 150–450)
PLATELET # BLD AUTO: 180 10(3)UL (ref 150–450)
PLATELET # BLD AUTO: 201 10(3)UL (ref 150–450)
PLATELET # BLD AUTO: 231 10(3)UL (ref 150–450)
PLATELET # BLD AUTO: 282 10(3)UL (ref 150–450)
PLATELET # BLD AUTO: 34 10(3)UL (ref 150–450)
PLATELET # BLD AUTO: 379 10(3)UL (ref 150–450)
PLATELET # BLD AUTO: 42 10(3)UL (ref 150–450)
PLATELET # BLD AUTO: 8 10(3)UL (ref 150–450)
PLATELET MORPHOLOGY: NORMAL
PLATELETS: 180 K/UL
PLATELETS: 194 K/UL
POTASSIUM BLOOD GAS: 4.2 MMOL/L (ref 3.6–5.1)
POTASSIUM BLOOD GAS: 5.1 MMOL/L (ref 3.6–5.1)
POTASSIUM BLOOD GAS: 5.4 MMOL/L (ref 3.6–5.1)
POTASSIUM BLOOD GAS: 6.7 MMOL/L (ref 3.6–5.1)
POTASSIUM SERPL-SCNC: 3.6 MMOL/L (ref 3.6–5.1)
POTASSIUM SERPL-SCNC: 3.6 MMOL/L (ref 3.6–5.1)
POTASSIUM SERPL-SCNC: 3.7 MMOL/L (ref 3.6–5.1)
POTASSIUM SERPL-SCNC: 3.8 MMOL/L (ref 3.6–5.1)
POTASSIUM SERPL-SCNC: 4.1 MMOL/L (ref 3.6–5.1)
POTASSIUM SERPL-SCNC: 4.1 MMOL/L (ref 3.6–5.1)
POTASSIUM SERPL-SCNC: 4.2 MMOL/L (ref 3.6–5.1)
POTASSIUM SERPL-SCNC: 4.2 MMOL/L (ref 3.6–5.1)
POTASSIUM SERPL-SCNC: 4.3 MMOL/L (ref 3.6–5.1)
POTASSIUM SERPL-SCNC: 4.3 MMOL/L (ref 3.6–5.1)
POTASSIUM SERPL-SCNC: 4.4 MMOL/L (ref 3.6–5.1)
POTASSIUM SERPL-SCNC: 4.5 MMOL/L (ref 3.6–5.1)
POTASSIUM SERPL-SCNC: 4.6 MMOL/L (ref 3.6–5.1)
POTASSIUM SERPL-SCNC: 4.7 MMOL/L (ref 3.6–5.1)
POTASSIUM SERPL-SCNC: 4.9 MMOL/L (ref 3.6–5.1)
POTASSIUM SERPL-SCNC: 5.3 MMOL/L (ref 3.6–5.1)
POTASSIUM SERPL-SCNC: 5.4 MMOL/L (ref 3.6–5.1)
POTASSIUM SERPL-SCNC: 5.6 MMOL/L (ref 3.6–5.1)
POTASSIUM SERPL-SCNC: 5.7 MMOL/L (ref 3.6–5.1)
POTASSIUM SERPL-SCNC: 5.8 MMOL/L (ref 3.6–5.1)
POTASSIUM SERPL-SCNC: 6 MMOL/L (ref 3.6–5.1)
POTASSIUM SERPL-SCNC: 6.1 MMOL/L (ref 3.6–5.1)
POTASSIUM, SERUM: 4 MEQ/L
POTASSIUM, SERUM: 4.1 MEQ/L
PRO-BETA NATRIURETIC PEPTIDE: 3661 PG/ML (ref ?–450)
PRO-BETA NATRIURETIC PEPTIDE: 8164 PG/ML (ref ?–450)
PRO-BETA NATRIURETIC PEPTIDE: ABNORMAL PG/ML (ref ?–450)
PRO-BETA NATRIURETIC PEPTIDE: ABNORMAL PG/ML (ref ?–450)
PROCALCITONIN SERPL-MCNC: 2.44 NG/ML (ref ?–0.11)
PROT UR STRIP.AUTO-MCNC: 30 MG/DL
PROT UR STRIP.AUTO-MCNC: NEGATIVE MG/DL
PROTEIN, TOTAL: 6.2 G/DL
PROTEIN, TOTAL: 6.4 G/DL
PSA SERPL DL<=0.01 NG/ML-MCNC: 14.3 SECONDS (ref 12–14.3)
Q-T INTERVAL: 448 MS
Q-T INTERVAL: 470 MS
Q-T INTERVAL: 528 MS
QRS DURATION: 162 MS
QRS DURATION: 164 MS
QRS DURATION: 180 MS
QTC CALCULATION (BEZET): 539 MS
QTC CALCULATION (BEZET): 593 MS
QTC CALCULATION (BEZET): 656 MS
R AXIS: -58 DEGREES
R AXIS: -64 DEGREES
R AXIS: 169 DEGREES
RBC # BLD AUTO: 2.18 X10(6)UL (ref 3.8–5.8)
RBC # BLD AUTO: 2.23 X10(6)UL (ref 3.8–5.8)
RBC # BLD AUTO: 2.55 X10(6)UL (ref 3.8–5.8)
RBC # BLD AUTO: 2.59 X10(6)UL (ref 3.8–5.8)
RBC # BLD AUTO: 2.72 X10(6)UL (ref 3.8–5.8)
RBC # BLD AUTO: 2.8 X10(6)UL (ref 3.8–5.8)
RBC # BLD AUTO: 2.86 X10(6)UL (ref 3.8–5.8)
RBC # BLD AUTO: 2.89 X10(6)UL (ref 3.8–5.8)
RBC # BLD AUTO: 2.9 X10(6)UL (ref 3.8–5.8)
RBC # BLD AUTO: 2.93 X10(6)UL (ref 3.8–5.8)
RBC # BLD AUTO: 3.03 X10(6)UL (ref 3.8–5.8)
RBC # BLD AUTO: 3.19 X10(6)UL (ref 3.8–5.8)
RBC # BLD AUTO: 3.31 X10(6)UL (ref 3.8–5.8)
RBC # BLD AUTO: 3.34 X10(6)UL (ref 3.8–5.8)
RBC # BLD AUTO: 3.42 X10(6)UL (ref 3.8–5.8)
RBC # BLD AUTO: 3.46 X10(6)UL (ref 3.8–5.8)
RBC # BLD AUTO: 3.66 X10(6)UL (ref 3.8–5.8)
RBC UR QL AUTO: NEGATIVE
RED BLOOD COUNT: 2.8 X 10-6/U
RED BLOOD COUNT: 3.03 X 10-6/U
RED CELL DISTRIBUTION WIDTH-SD: 47.7 FL (ref 35.1–46.3)
RED CELL DISTRIBUTION WIDTH-SD: 48.2 FL (ref 35.1–46.3)
RED CELL DISTRIBUTION WIDTH-SD: 49.8 FL (ref 35.1–46.3)
RED CELL DISTRIBUTION WIDTH-SD: 51.7 FL (ref 35.1–46.3)
RED CELL DISTRIBUTION WIDTH-SD: 53.9 FL (ref 35.1–46.3)
RED CELL DISTRIBUTION WIDTH-SD: 54.6 FL (ref 35.1–46.3)
RED CELL DISTRIBUTION WIDTH-SD: 55.8 FL (ref 35.1–46.3)
RED CELL DISTRIBUTION WIDTH-SD: 63 FL (ref 35.1–46.3)
RED CELL DISTRIBUTION WIDTH-SD: 65.5 FL (ref 35.1–46.3)
RED CELL DISTRIBUTION WIDTH-SD: 66.9 FL (ref 35.1–46.3)
RED CELL DISTRIBUTION WIDTH-SD: 67 FL (ref 35.1–46.3)
RED CELL DISTRIBUTION WIDTH-SD: 67.1 FL (ref 35.1–46.3)
RED CELL DISTRIBUTION WIDTH-SD: 67.3 FL (ref 35.1–46.3)
RED CELL DISTRIBUTION WIDTH-SD: 68 FL (ref 35.1–46.3)
RED CELL DISTRIBUTION WIDTH-SD: 69.5 FL (ref 35.1–46.3)
RED CELL DISTRIBUTION WIDTH-SD: 69.6 FL (ref 35.1–46.3)
RED CELL DISTRIBUTION WIDTH-SD: 77.5 FL (ref 35.1–46.3)
RH BLOOD TYPE: POSITIVE
SGOT (AST): 14 IU/L
SGOT (AST): 15 IU/L
SGPT (ALT): 21 IU/L
SGPT (ALT): 22 IU/L
SODIUM BLOOD GAS: 125 MMOL/L (ref 136–144)
SODIUM BLOOD GAS: 126 MMOL/L (ref 136–144)
SODIUM BLOOD GAS: 127 MMOL/L (ref 136–144)
SODIUM BLOOD GAS: 131 MMOL/L (ref 136–144)
SODIUM SERPL-SCNC: 125 MMOL/L (ref 136–144)
SODIUM SERPL-SCNC: 125 MMOL/L (ref 136–144)
SODIUM SERPL-SCNC: 126 MMOL/L (ref 136–144)
SODIUM SERPL-SCNC: 127 MMOL/L (ref 136–144)
SODIUM SERPL-SCNC: 128 MMOL/L (ref 136–144)
SODIUM SERPL-SCNC: 129 MMOL/L (ref 136–144)
SODIUM SERPL-SCNC: 129 MMOL/L (ref 136–144)
SODIUM SERPL-SCNC: 130 MMOL/L (ref 136–144)
SODIUM SERPL-SCNC: 131 MMOL/L (ref 136–144)
SODIUM SERPL-SCNC: 132 MMOL/L (ref 136–144)
SODIUM SERPL-SCNC: 133 MMOL/L (ref 136–144)
SODIUM SERPL-SCNC: 134 MMOL/L (ref 136–144)
SODIUM SERPL-SCNC: 134 MMOL/L (ref 136–144)
SODIUM SERPL-SCNC: 135 MMOL/L (ref 136–144)
SODIUM SERPL-SCNC: 136 MMOL/L (ref 136–144)
SODIUM SERPL-SCNC: 137 MMOL/L (ref 136–144)
SODIUM SERPL-SCNC: 138 MMOL/L (ref 136–144)
SODIUM SERPL-SCNC: 138 MMOL/L (ref 136–144)
SODIUM SERPL-SCNC: 24 MMOL/L
SODIUM: 136 MEQ/L
SODIUM: 138 MEQ/L
SP GR UR STRIP.AUTO: 1.02 (ref 1–1.03)
SP GR UR STRIP.AUTO: 1.02 (ref 1–1.03)
T AXIS: -78 DEGREES
T AXIS: 108 DEGREES
T AXIS: 111 DEGREES
TIDAL VOLUME: 600 ML
TOTAL CELLS COUNTED: 100
TOTAL HEMOGLOBIN: 10 G/DL (ref 12.6–17.4)
TOTAL HEMOGLOBIN: 10.3 G/DL (ref 12.6–17.4)
TOTAL HEMOGLOBIN: 10.6 G/DL (ref 12.6–17.4)
TOTAL HEMOGLOBIN: 10.6 G/DL (ref 12.6–17.4)
TOTAL HEMOGLOBIN: 10.8 G/DL (ref 12.6–17.4)
TOTAL HEMOGLOBIN: 12.3 G/DL (ref 12.6–17.4)
TOTAL IRON BINDING CAPACITY: 189 UG/DL (ref 298–536)
TRANSFERRIN: 127 MG/DL (ref 200–360)
TRIGLYCERIDES: 91 MG/DL (ref ?–150)
TROPONIN: 10 NG/ML (ref ?–0.05)
TROPONIN: 17.7 NG/ML (ref ?–0.05)
TROPONIN: 18.9 NG/ML (ref ?–0.05)
TROPONIN: 7.1 NG/ML (ref ?–0.05)
TROPONIN: 8.31 NG/ML (ref ?–0.05)
TROPONIN: 9.69 NG/ML (ref ?–0.05)
TSI SER-ACNC: 1.2 MIU/ML (ref 0.35–5.5)
TSI SER-ACNC: 1.34 MIU/ML (ref 0.35–5.5)
TSI SER-ACNC: 1.51 MIU/ML (ref 0.35–5.5)
TSI SER-ACNC: 2.06 MIU/ML (ref 0.35–5.5)
URIC ACID: 8.9 MG/DL (ref 2.4–8.7)
UROBILINOGEN UR STRIP.AUTO-MCNC: 0.2 MG/DL
UROBILINOGEN UR STRIP.AUTO-MCNC: 2 MG/DL
VENT RATE: 20 /MIN
VENTRICULAR RATE: 87 BPM
VENTRICULAR RATE: 93 BPM
VENTRICULAR RATE: 96 BPM
WBC # BLD AUTO: 0.8 X10(3) UL (ref 4–13)
WBC # BLD AUTO: 0.9 X10(3) UL (ref 4–13)
WBC # BLD AUTO: 14.1 X10(3) UL (ref 4–13)
WBC # BLD AUTO: 16.2 X10(3) UL (ref 4–13)
WBC # BLD AUTO: 18 X10(3) UL (ref 4–13)
WBC # BLD AUTO: 22.6 X10(3) UL (ref 4–13)
WBC # BLD AUTO: 3.8 X10(3) UL (ref 4–13)
WBC # BLD AUTO: 5.4 X10(3) UL (ref 4–13)
WBC # BLD AUTO: 5.9 X10(3) UL (ref 4–13)
WBC # BLD AUTO: 6.2 X10(3) UL (ref 4–13)
WBC # BLD AUTO: 6.6 X10(3) UL (ref 4–13)
WBC # BLD AUTO: 6.9 X10(3) UL (ref 4–13)
WBC # BLD AUTO: 7.2 X10(3) UL (ref 4–13)
WBC # BLD AUTO: 7.2 X10(3) UL (ref 4–13)
WBC # BLD AUTO: 7.5 X10(3) UL (ref 4–13)
WBC # BLD AUTO: 7.7 X10(3) UL (ref 4–13)
WBC # BLD AUTO: 9.7 X10(3) UL (ref 4–13)
WHITE BLOOD COUNT: 6.9 X 10-3/U
WHITE BLOOD COUNT: 7.9 X 10-3/U

## 2017-01-01 PROCEDURE — 96375 TX/PRO/DX INJ NEW DRUG ADDON: CPT

## 2017-01-01 PROCEDURE — 83540 ASSAY OF IRON: CPT | Performed by: NURSE PRACTITIONER

## 2017-01-01 PROCEDURE — 36415 COLL VENOUS BLD VENIPUNCTURE: CPT

## 2017-01-01 PROCEDURE — 99215 OFFICE O/P EST HI 40 MIN: CPT

## 2017-01-01 PROCEDURE — 96361 HYDRATE IV INFUSION ADD-ON: CPT

## 2017-01-01 PROCEDURE — 02HV33Z INSERTION OF INFUSION DEVICE INTO SUPERIOR VENA CAVA, PERCUTANEOUS APPROACH: ICD-10-PCS | Performed by: INTERNAL MEDICINE

## 2017-01-01 PROCEDURE — 99214 OFFICE O/P EST MOD 30 MIN: CPT

## 2017-01-01 PROCEDURE — 99214 OFFICE O/P EST MOD 30 MIN: CPT | Performed by: NURSE PRACTITIONER

## 2017-01-01 PROCEDURE — 80048 BASIC METABOLIC PNL TOTAL CA: CPT | Performed by: NURSE PRACTITIONER

## 2017-01-01 PROCEDURE — 71010 XR CHEST AP PORTABLE  (CPT=71010): CPT | Performed by: INTERNAL MEDICINE

## 2017-01-01 PROCEDURE — 96413 CHEMO IV INFUSION 1 HR: CPT

## 2017-01-01 PROCEDURE — 99233 SBSQ HOSP IP/OBS HIGH 50: CPT | Performed by: INTERNAL MEDICINE

## 2017-01-01 PROCEDURE — 99232 SBSQ HOSP IP/OBS MODERATE 35: CPT | Performed by: INTERNAL MEDICINE

## 2017-01-01 PROCEDURE — 99223 1ST HOSP IP/OBS HIGH 75: CPT | Performed by: INTERNAL MEDICINE

## 2017-01-01 PROCEDURE — 99214 OFFICE O/P EST MOD 30 MIN: CPT | Performed by: INTERNAL MEDICINE

## 2017-01-01 PROCEDURE — 93306 TTE W/DOPPLER COMPLETE: CPT | Performed by: INTERNAL MEDICINE

## 2017-01-01 PROCEDURE — 93000 ELECTROCARDIOGRAM COMPLETE: CPT | Performed by: INTERNAL MEDICINE

## 2017-01-01 PROCEDURE — 96374 THER/PROPH/DIAG INJ IV PUSH: CPT

## 2017-01-01 PROCEDURE — 99223 1ST HOSP IP/OBS HIGH 75: CPT | Performed by: HOSPITALIST

## 2017-01-01 PROCEDURE — 76881 US COMPL JOINT R-T W/IMG: CPT | Performed by: ORTHOPAEDIC SURGERY

## 2017-01-01 PROCEDURE — 99232 SBSQ HOSP IP/OBS MODERATE 35: CPT | Performed by: NURSE PRACTITIONER

## 2017-01-01 PROCEDURE — 99215 OFFICE O/P EST HI 40 MIN: CPT | Performed by: INTERNAL MEDICINE

## 2017-01-01 PROCEDURE — 71020 XR CHEST PA + LAT CHEST (CPT=71020): CPT | Performed by: EMERGENCY MEDICINE

## 2017-01-01 PROCEDURE — 30233N1 TRANSFUSION OF NONAUTOLOGOUS RED BLOOD CELLS INTO PERIPHERAL VEIN, PERCUTANEOUS APPROACH: ICD-10-PCS | Performed by: INTERNAL MEDICINE

## 2017-01-01 PROCEDURE — 86900 BLOOD TYPING SEROLOGIC ABO: CPT

## 2017-01-01 PROCEDURE — 85025 COMPLETE CBC W/AUTO DIFF WBC: CPT

## 2017-01-01 PROCEDURE — 82962 GLUCOSE BLOOD TEST: CPT

## 2017-01-01 PROCEDURE — 86901 BLOOD TYPING SEROLOGIC RH(D): CPT

## 2017-01-01 PROCEDURE — 71010 XR CHEST AP/PA (1 VIEW) (CPT=71010): CPT | Performed by: HOSPITALIST

## 2017-01-01 PROCEDURE — 83550 IRON BINDING TEST: CPT | Performed by: NURSE PRACTITIONER

## 2017-01-01 PROCEDURE — A4216 STERILE WATER/SALINE, 10 ML: HCPCS | Performed by: NURSE PRACTITIONER

## 2017-01-01 PROCEDURE — 85025 COMPLETE CBC W/AUTO DIFF WBC: CPT | Performed by: NURSE PRACTITIONER

## 2017-01-01 PROCEDURE — 70450 CT HEAD/BRAIN W/O DYE: CPT | Performed by: INTERNAL MEDICINE

## 2017-01-01 PROCEDURE — 99214 OFFICE O/P EST MOD 30 MIN: CPT | Performed by: CLINICAL NURSE SPECIALIST

## 2017-01-01 PROCEDURE — 71250 CT THORAX DX C-: CPT

## 2017-01-01 PROCEDURE — 99215 OFFICE O/P EST HI 40 MIN: CPT | Performed by: CLINICAL NURSE SPECIALIST

## 2017-01-01 PROCEDURE — 83615 LACTATE (LD) (LDH) ENZYME: CPT

## 2017-01-01 PROCEDURE — 71020 XR CHEST PA + LAT CHEST (CPT=71020): CPT

## 2017-01-01 PROCEDURE — 36415 COLL VENOUS BLD VENIPUNCTURE: CPT | Performed by: NURSE PRACTITIONER

## 2017-01-01 PROCEDURE — 86850 RBC ANTIBODY SCREEN: CPT

## 2017-01-01 PROCEDURE — G9678 ONCOLOGY CARE MODEL SERVICE: HCPCS | Performed by: INTERNAL MEDICINE

## 2017-01-01 PROCEDURE — 71250 CT THORAX DX C-: CPT | Performed by: INTERNAL MEDICINE

## 2017-01-01 PROCEDURE — 99239 HOSP IP/OBS DSCHRG MGMT >30: CPT | Performed by: INTERNAL MEDICINE

## 2017-01-01 PROCEDURE — 96372 THER/PROPH/DIAG INJ SC/IM: CPT

## 2017-01-01 PROCEDURE — 80053 COMPREHEN METABOLIC PANEL: CPT

## 2017-01-01 PROCEDURE — 99213 OFFICE O/P EST LOW 20 MIN: CPT | Performed by: INTERNAL MEDICINE

## 2017-01-01 PROCEDURE — 96360 HYDRATION IV INFUSION INIT: CPT

## 2017-01-01 PROCEDURE — 83880 ASSAY OF NATRIURETIC PEPTIDE: CPT | Performed by: NURSE PRACTITIONER

## 2017-01-01 PROCEDURE — 30233R1 TRANSFUSION OF NONAUTOLOGOUS PLATELETS INTO PERIPHERAL VEIN, PERCUTANEOUS APPROACH: ICD-10-PCS | Performed by: INTERNAL MEDICINE

## 2017-01-01 PROCEDURE — 99291 CRITICAL CARE FIRST HOUR: CPT | Performed by: HOSPITALIST

## 2017-01-01 PROCEDURE — 74150 CT ABDOMEN W/O CONTRAST: CPT | Performed by: INTERNAL MEDICINE

## 2017-01-01 PROCEDURE — 36430 TRANSFUSION BLD/BLD COMPNT: CPT

## 2017-01-01 PROCEDURE — 78815 PET IMAGE W/CT SKULL-THIGH: CPT | Performed by: INTERNAL MEDICINE

## 2017-01-01 PROCEDURE — 96417 CHEMO IV INFUS EACH ADDL SEQ: CPT

## 2017-01-01 PROCEDURE — 99223 1ST HOSP IP/OBS HIGH 75: CPT | Performed by: NURSE PRACTITIONER

## 2017-01-01 PROCEDURE — 85027 COMPLETE CBC AUTOMATED: CPT | Performed by: NURSE PRACTITIONER

## 2017-01-01 PROCEDURE — 73010 X-RAY EXAM OF SHOULDER BLADE: CPT | Performed by: CLINICAL NURSE SPECIALIST

## 2017-01-01 PROCEDURE — 99238 HOSP IP/OBS DSCHRG MGMT 30/<: CPT | Performed by: INTERNAL MEDICINE

## 2017-01-01 PROCEDURE — 84550 ASSAY OF BLOOD/URIC ACID: CPT | Performed by: NURSE PRACTITIONER

## 2017-01-01 PROCEDURE — 71020 XR CHEST PA + LAT CHEST (CPT=71020): CPT | Performed by: INTERNAL MEDICINE

## 2017-01-01 RX ORDER — METOPROLOL SUCCINATE 25 MG/1
25 TABLET, EXTENDED RELEASE ORAL EVERY EVENING
Status: DISCONTINUED | OUTPATIENT
Start: 2017-01-01 | End: 2017-01-01

## 2017-01-01 RX ORDER — DEXTROSE MONOHYDRATE 25 G/50ML
50 INJECTION, SOLUTION INTRAVENOUS
Status: DISCONTINUED | OUTPATIENT
Start: 2017-01-01 | End: 2017-01-01

## 2017-01-01 RX ORDER — GABAPENTIN 300 MG/1
600 CAPSULE ORAL 2 TIMES DAILY
Status: DISCONTINUED | OUTPATIENT
Start: 2017-01-01 | End: 2017-01-01

## 2017-01-01 RX ORDER — HYDROCODONE BITARTRATE AND ACETAMINOPHEN 10; 325 MG/1; MG/1
1 TABLET ORAL EVERY 6 HOURS PRN
Status: DISCONTINUED | OUTPATIENT
Start: 2017-01-01 | End: 2017-01-01

## 2017-01-01 RX ORDER — TOLVAPTAN 15 MG/1
15 TABLET ORAL ONCE
Status: COMPLETED | OUTPATIENT
Start: 2017-01-01 | End: 2017-01-01

## 2017-01-01 RX ORDER — BUPROPION HYDROCHLORIDE 150 MG/1
150 TABLET, EXTENDED RELEASE ORAL DAILY
Status: DISCONTINUED | OUTPATIENT
Start: 2017-01-01 | End: 2017-01-01

## 2017-01-01 RX ORDER — DIPHENHYDRAMINE HCL 25 MG
25 CAPSULE ORAL ONCE
Status: CANCELLED | OUTPATIENT
Start: 2017-01-01

## 2017-01-01 RX ORDER — SIMVASTATIN 40 MG
40 TABLET ORAL NIGHTLY
Qty: 90 TABLET | Refills: 2 | Status: SHIPPED | OUTPATIENT
Start: 2017-01-01

## 2017-01-01 RX ORDER — IPRATROPIUM BROMIDE AND ALBUTEROL SULFATE 2.5; .5 MG/3ML; MG/3ML
3 SOLUTION RESPIRATORY (INHALATION)
Status: DISCONTINUED | OUTPATIENT
Start: 2017-01-01 | End: 2017-01-01

## 2017-01-01 RX ORDER — FLUCONAZOLE 50 MG/1
TABLET ORAL
Qty: 7 TABLET | Refills: 1 | Status: ON HOLD | OUTPATIENT
Start: 2017-01-01 | End: 2017-01-01

## 2017-01-01 RX ORDER — ALPRAZOLAM 0.5 MG/1
0.5 TABLET ORAL 3 TIMES DAILY PRN
Status: ON HOLD | COMMUNITY
End: 2017-01-01

## 2017-01-01 RX ORDER — SPIRONOLACTONE 25 MG/1
25 TABLET ORAL DAILY
Status: DISCONTINUED | OUTPATIENT
Start: 2017-01-01 | End: 2017-01-01

## 2017-01-01 RX ORDER — NYSTATIN 100000 U/G
OINTMENT TOPICAL 2 TIMES DAILY
Status: DISCONTINUED | OUTPATIENT
Start: 2017-01-01 | End: 2017-01-01

## 2017-01-01 RX ORDER — ACETAMINOPHEN 325 MG/1
650 TABLET ORAL ONCE
Status: DISCONTINUED | OUTPATIENT
Start: 2017-01-01 | End: 2017-01-01

## 2017-01-01 RX ORDER — FUROSEMIDE 10 MG/ML
40 INJECTION INTRAMUSCULAR; INTRAVENOUS ONCE
Status: DISCONTINUED | OUTPATIENT
Start: 2017-01-01 | End: 2017-01-01

## 2017-01-01 RX ORDER — ATORVASTATIN CALCIUM 20 MG/1
20 TABLET, FILM COATED ORAL NIGHTLY
Status: DISCONTINUED | OUTPATIENT
Start: 2017-01-01 | End: 2017-01-01

## 2017-01-01 RX ORDER — GABAPENTIN 300 MG/1
600 CAPSULE ORAL 2 TIMES DAILY
Qty: 360 CAPSULE | Refills: 0 | Status: SHIPPED | OUTPATIENT
Start: 2017-01-01 | End: 2017-01-01

## 2017-01-01 RX ORDER — PARICALCITOL 1 UG/1
1 CAPSULE, LIQUID FILLED ORAL DAILY
Qty: 90 CAPSULE | Refills: 0 | Status: SHIPPED | OUTPATIENT
Start: 2017-01-01 | End: 2017-01-01

## 2017-01-01 RX ORDER — FAMCICLOVIR 500 MG/1
500 TABLET, FILM COATED ORAL 3 TIMES DAILY
Qty: 30 TABLET | Refills: 0 | Status: SHIPPED | OUTPATIENT
Start: 2017-01-01 | End: 2017-01-01 | Stop reason: ALTCHOICE

## 2017-01-01 RX ORDER — SODIUM CHLORIDE 9 MG/ML
INJECTION, SOLUTION INTRAVENOUS CONTINUOUS
Status: DISCONTINUED | OUTPATIENT
Start: 2017-01-01 | End: 2017-01-01

## 2017-01-01 RX ORDER — HYDROCODONE BITARTRATE AND ACETAMINOPHEN 5; 325 MG/1; MG/1
1 TABLET ORAL EVERY 6 HOURS PRN
Status: DISCONTINUED | OUTPATIENT
Start: 2017-01-01 | End: 2017-01-01

## 2017-01-01 RX ORDER — BUMETANIDE 0.25 MG/ML
4 INJECTION, SOLUTION INTRAMUSCULAR; INTRAVENOUS ONCE
Status: CANCELLED
Start: 2017-01-01 | End: 2017-01-01

## 2017-01-01 RX ORDER — LORAZEPAM 2 MG/ML
1 INJECTION INTRAMUSCULAR EVERY 4 HOURS PRN
Status: DISCONTINUED | OUTPATIENT
Start: 2017-01-01 | End: 2017-01-01

## 2017-01-01 RX ORDER — HEPARIN SODIUM 5000 [USP'U]/ML
5000 INJECTION, SOLUTION INTRAVENOUS; SUBCUTANEOUS EVERY 12 HOURS SCHEDULED
Status: DISCONTINUED | OUTPATIENT
Start: 2017-01-01 | End: 2017-01-01

## 2017-01-01 RX ORDER — ACETAMINOPHEN 325 MG/1
650 TABLET ORAL ONCE
Status: CANCELLED | OUTPATIENT
Start: 2017-01-01

## 2017-01-01 RX ORDER — DOBUTAMINE HYDROCHLORIDE 200 MG/100ML
INJECTION INTRAVENOUS
Status: COMPLETED
Start: 2017-01-01 | End: 2017-01-01

## 2017-01-01 RX ORDER — ACETAMINOPHEN 325 MG/1
650 TABLET ORAL ONCE
Status: COMPLETED | OUTPATIENT
Start: 2017-01-01 | End: 2017-01-01

## 2017-01-01 RX ORDER — CODEINE PHOSPHATE AND GUAIFENESIN 10; 100 MG/5ML; MG/5ML
5 SOLUTION ORAL EVERY 6 HOURS PRN
Qty: 120 ML | Refills: 0 | Status: SHIPPED
Start: 2017-01-01 | End: 2017-01-01

## 2017-01-01 RX ORDER — CALCIUM CHLORIDE 100 MG/ML
1 INJECTION INTRAVENOUS; INTRAVENTRICULAR ONCE
Status: DISCONTINUED | OUTPATIENT
Start: 2017-01-01 | End: 2017-01-01 | Stop reason: SDUPTHER

## 2017-01-01 RX ORDER — IPRATROPIUM BROMIDE AND ALBUTEROL SULFATE 2.5; .5 MG/3ML; MG/3ML
3 SOLUTION RESPIRATORY (INHALATION) ONCE
Status: COMPLETED | OUTPATIENT
Start: 2017-01-01 | End: 2017-01-01

## 2017-01-01 RX ORDER — HEPARIN SODIUM AND DEXTROSE 10000; 5 [USP'U]/100ML; G/100ML
1000 INJECTION INTRAVENOUS ONCE
Status: COMPLETED | OUTPATIENT
Start: 2017-01-01 | End: 2017-01-01

## 2017-01-01 RX ORDER — ALBUTEROL SULFATE 2.5 MG/3ML
10 SOLUTION RESPIRATORY (INHALATION) CONTINUOUS
Status: DISCONTINUED | OUTPATIENT
Start: 2017-01-01 | End: 2017-01-01

## 2017-01-01 RX ORDER — TORSEMIDE 20 MG/1
40 TABLET ORAL DAILY
Qty: 180 TABLET | Refills: 3 | Status: SHIPPED | OUTPATIENT
Start: 2017-01-01 | End: 2017-01-01

## 2017-01-01 RX ORDER — ACETAMINOPHEN 325 MG/1
650 TABLET ORAL EVERY 6 HOURS PRN
Status: DISCONTINUED | OUTPATIENT
Start: 2017-01-01 | End: 2017-01-01

## 2017-01-01 RX ORDER — ALPRAZOLAM 0.5 MG/1
0.5 TABLET ORAL 3 TIMES DAILY PRN
Status: DISCONTINUED | OUTPATIENT
Start: 2017-01-01 | End: 2017-01-01

## 2017-01-01 RX ORDER — BUMETANIDE 0.25 MG/ML
4 INJECTION, SOLUTION INTRAMUSCULAR; INTRAVENOUS ONCE
Status: COMPLETED | OUTPATIENT
Start: 2017-01-01 | End: 2017-01-01

## 2017-01-01 RX ORDER — SPIRONOLACTONE 25 MG/1
12.5 TABLET ORAL DAILY
Status: DISCONTINUED | OUTPATIENT
Start: 2017-01-01 | End: 2017-01-01

## 2017-01-01 RX ORDER — SODIUM POLYSTYRENE SULFONATE 15 G/60ML
15 SUSPENSION ORAL; RECTAL ONCE
Status: COMPLETED | OUTPATIENT
Start: 2017-01-01 | End: 2017-01-01

## 2017-01-01 RX ORDER — ASPIRIN 81 MG/1
81 TABLET, CHEWABLE ORAL DAILY
Status: DISCONTINUED | OUTPATIENT
Start: 2017-01-01 | End: 2017-01-01

## 2017-01-01 RX ORDER — HYDROCODONE BITARTRATE AND ACETAMINOPHEN 5; 325 MG/1; MG/1
1 TABLET ORAL 3 TIMES DAILY PRN
Qty: 60 TABLET | Refills: 0 | Status: SHIPPED | OUTPATIENT
Start: 2017-01-01 | End: 2017-01-01

## 2017-01-01 RX ORDER — DIPHENHYDRAMINE HYDROCHLORIDE 50 MG/ML
INJECTION INTRAMUSCULAR; INTRAVENOUS EVERY 4 HOURS PRN
Status: CANCELLED | OUTPATIENT
Start: 2017-01-01

## 2017-01-01 RX ORDER — ONDANSETRON HYDROCHLORIDE 8 MG/1
8 TABLET, FILM COATED ORAL EVERY 8 HOURS PRN
Qty: 30 TABLET | Refills: 3 | Status: SHIPPED | OUTPATIENT
Start: 2017-01-01

## 2017-01-01 RX ORDER — BUMETANIDE 0.25 MG/ML
4 INJECTION, SOLUTION INTRAMUSCULAR; INTRAVENOUS ONCE
Status: DISCONTINUED | OUTPATIENT
Start: 2017-01-01 | End: 2017-01-01

## 2017-01-01 RX ORDER — SODIUM POLYSTYRENE SULFONATE 15 G/60ML
30 SUSPENSION ORAL; RECTAL ONCE
Status: DISCONTINUED | OUTPATIENT
Start: 2017-01-01 | End: 2017-01-01

## 2017-01-01 RX ORDER — CLOTRIMAZOLE AND BETAMETHASONE DIPROPIONATE 10; .64 MG/G; MG/G
1 CREAM TOPICAL 2 TIMES DAILY
Qty: 45 G | Refills: 1 | Status: ON HOLD | OUTPATIENT
Start: 2017-01-01 | End: 2017-01-01

## 2017-01-01 RX ORDER — IPRATROPIUM BROMIDE AND ALBUTEROL SULFATE 2.5; .5 MG/3ML; MG/3ML
SOLUTION RESPIRATORY (INHALATION)
Status: DISCONTINUED
Start: 2017-01-01 | End: 2017-01-01 | Stop reason: WASHOUT

## 2017-01-01 RX ORDER — BUPROPION HYDROCHLORIDE 150 MG/1
150 TABLET ORAL NIGHTLY
Qty: 90 TABLET | Refills: 1 | Status: SHIPPED | OUTPATIENT
Start: 2017-01-01 | End: 2017-01-01

## 2017-01-01 RX ORDER — ACETAMINOPHEN 325 MG/1
TABLET ORAL EVERY 6 HOURS PRN
Status: CANCELLED | OUTPATIENT
Start: 2017-01-01

## 2017-01-01 RX ORDER — ONDANSETRON 2 MG/ML
4 INJECTION INTRAMUSCULAR; INTRAVENOUS EVERY 6 HOURS PRN
Status: DISCONTINUED | OUTPATIENT
Start: 2017-01-01 | End: 2017-01-01

## 2017-01-01 RX ORDER — METOLAZONE 2.5 MG/1
TABLET ORAL
Qty: 10 TABLET | Refills: 1 | Status: SHIPPED | OUTPATIENT
Start: 2017-01-01 | End: 2017-01-01

## 2017-01-01 RX ORDER — DEXTROSE AND SODIUM CHLORIDE 5; .9 G/100ML; G/100ML
INJECTION, SOLUTION INTRAVENOUS CONTINUOUS
Status: DISCONTINUED | OUTPATIENT
Start: 2017-01-01 | End: 2017-01-01

## 2017-01-01 RX ORDER — ALPRAZOLAM 0.5 MG/1
0.5 TABLET ORAL 3 TIMES DAILY PRN
Qty: 5 TABLET | Refills: 0 | Status: SHIPPED | OUTPATIENT
Start: 2017-01-01

## 2017-01-01 RX ORDER — AZITHROMYCIN 250 MG/1
TABLET, FILM COATED ORAL
Qty: 1 PACKAGE | Refills: 0 | Status: ON HOLD | OUTPATIENT
Start: 2017-01-01 | End: 2017-01-01

## 2017-01-01 RX ORDER — LEVOFLOXACIN 250 MG/1
TABLET ORAL
Qty: 7 TABLET | Refills: 0 | Status: SHIPPED | OUTPATIENT
Start: 2017-01-01 | End: 2017-01-01

## 2017-01-01 RX ORDER — GABAPENTIN 100 MG/1
600 CAPSULE ORAL 2 TIMES DAILY
Refills: 0 | Status: CANCELLED | OUTPATIENT
Start: 2017-01-01

## 2017-01-01 RX ORDER — MEPERIDINE HYDROCHLORIDE 25 MG/ML
INJECTION INTRAMUSCULAR; INTRAVENOUS; SUBCUTANEOUS AS NEEDED
Status: CANCELLED | OUTPATIENT
Start: 2017-01-01

## 2017-01-01 RX ORDER — FUROSEMIDE 10 MG/ML
40 INJECTION INTRAMUSCULAR; INTRAVENOUS ONCE
Status: CANCELLED | OUTPATIENT
Start: 2017-01-01

## 2017-01-01 RX ORDER — ALBUTEROL SULFATE 2.5 MG/3ML
2.5 SOLUTION RESPIRATORY (INHALATION) EVERY 6 HOURS PRN
Status: DISCONTINUED | OUTPATIENT
Start: 2017-01-01 | End: 2017-01-01

## 2017-01-01 RX ORDER — PANTOPRAZOLE SODIUM 40 MG/1
40 TABLET, DELAYED RELEASE ORAL
Qty: 90 TABLET | Refills: 1 | Status: SHIPPED | OUTPATIENT
Start: 2017-01-01 | End: 2017-01-01

## 2017-01-01 RX ORDER — SPIRONOLACTONE 25 MG/1
25 TABLET ORAL DAILY
COMMUNITY
End: 2017-01-01

## 2017-01-01 RX ORDER — FEBUXOSTAT 40 MG/1
80 TABLET, FILM COATED ORAL DAILY
Status: DISCONTINUED | OUTPATIENT
Start: 2017-01-01 | End: 2017-01-01

## 2017-01-01 RX ORDER — PREDNISONE 10 MG/1
TABLET ORAL
Qty: 9 TABLET | Refills: 0 | COMMUNITY
Start: 2017-01-01 | End: 2017-01-01 | Stop reason: ALTCHOICE

## 2017-01-01 RX ORDER — PREDNISONE 20 MG/1
20 TABLET ORAL DAILY
Qty: 7 TABLET | Refills: 0 | Status: SHIPPED | OUTPATIENT
Start: 2017-01-01 | End: 2017-01-01 | Stop reason: ALTCHOICE

## 2017-01-01 RX ORDER — HYDROCODONE BITARTRATE AND ACETAMINOPHEN 7.5; 325 MG/1; MG/1
1 TABLET ORAL EVERY 6 HOURS PRN
Qty: 90 TABLET | Refills: 0 | Status: ON HOLD | OUTPATIENT
Start: 2017-01-01 | End: 2017-01-01

## 2017-01-01 RX ORDER — ASPIRIN 81 MG/1
81 TABLET ORAL
Status: DISCONTINUED | OUTPATIENT
Start: 2017-01-01 | End: 2017-01-01

## 2017-01-01 RX ORDER — DOXERCALCIFEROL 0.5 UG/1
0.5 CAPSULE ORAL DAILY
Status: DISCONTINUED | OUTPATIENT
Start: 2017-01-01 | End: 2017-01-01

## 2017-01-01 RX ORDER — ASPIRIN 81 MG/1
81 TABLET ORAL DAILY
Status: DISCONTINUED | OUTPATIENT
Start: 2017-01-01 | End: 2017-01-01

## 2017-01-01 RX ORDER — ALBUTEROL SULFATE 90 UG/1
2 AEROSOL, METERED RESPIRATORY (INHALATION) AS NEEDED
Status: CANCELLED | OUTPATIENT
Start: 2017-01-01

## 2017-01-01 RX ORDER — SIMVASTATIN 40 MG
40 TABLET ORAL NIGHTLY
COMMUNITY
End: 2017-01-01

## 2017-01-01 RX ORDER — DOBUTAMINE HYDROCHLORIDE 200 MG/100ML
5 INJECTION INTRAVENOUS CONTINUOUS
Status: DISCONTINUED | OUTPATIENT
Start: 2017-01-01 | End: 2017-01-01

## 2017-01-01 RX ORDER — INSULIN ASPART 100 [IU]/ML
6 INJECTION, SOLUTION INTRAVENOUS; SUBCUTANEOUS ONCE
Status: DISCONTINUED | OUTPATIENT
Start: 2017-01-01 | End: 2017-01-01

## 2017-01-01 RX ORDER — DIPHENHYDRAMINE HCL 25 MG
25 CAPSULE ORAL ONCE
Status: COMPLETED | OUTPATIENT
Start: 2017-01-01 | End: 2017-01-01

## 2017-01-01 RX ORDER — METOLAZONE 2.5 MG/1
2.5 TABLET ORAL
Qty: 5 TABLET | Refills: 0 | Status: SHIPPED | OUTPATIENT
Start: 2017-01-01 | End: 2017-01-01

## 2017-01-01 RX ORDER — TORSEMIDE 20 MG/1
40 TABLET ORAL DAILY
Status: DISCONTINUED | OUTPATIENT
Start: 2017-01-01 | End: 2017-01-01

## 2017-01-01 RX ORDER — CALCIUM GLUCONATE 94 MG/ML
0.5 INJECTION, SOLUTION INTRAVENOUS ONCE
Status: DISCONTINUED | OUTPATIENT
Start: 2017-01-01 | End: 2017-01-01

## 2017-01-01 RX ORDER — PANTOPRAZOLE SODIUM 40 MG/1
40 TABLET, DELAYED RELEASE ORAL
Status: DISCONTINUED | OUTPATIENT
Start: 2017-01-01 | End: 2017-01-01

## 2017-01-01 RX ORDER — SPIRONOLACTONE 25 MG/1
25 TABLET ORAL DAILY
Refills: 0 | Status: SHIPPED | COMMUNITY
Start: 2017-01-01

## 2017-01-01 RX ORDER — SPIRONOLACTONE 25 MG/1
12.5 TABLET ORAL DAILY
COMMUNITY
End: 2017-01-01

## 2017-01-01 RX ORDER — PANTOPRAZOLE SODIUM 40 MG/1
40 TABLET, DELAYED RELEASE ORAL
Qty: 90 TABLET | Refills: 2 | Status: SHIPPED | OUTPATIENT
Start: 2017-01-01

## 2017-01-01 RX ORDER — TORSEMIDE 20 MG/1
40 TABLET ORAL DAILY
COMMUNITY

## 2017-01-01 RX ORDER — PARICALCITOL 1 UG/1
1 CAPSULE, LIQUID FILLED ORAL DAILY
Qty: 90 CAPSULE | Refills: 3 | Status: SHIPPED | OUTPATIENT
Start: 2017-01-01

## 2017-01-01 RX ORDER — FEBUXOSTAT 80 MG/1
1 TABLET, FILM COATED ORAL DAILY
Qty: 90 TABLET | Refills: 1 | Status: SHIPPED | OUTPATIENT
Start: 2017-01-01 | End: 2017-01-01

## 2017-01-01 RX ORDER — SODIUM POLYSTYRENE SULFONATE 15 G/60ML
30 SUSPENSION ORAL; RECTAL ONCE
Status: COMPLETED | OUTPATIENT
Start: 2017-01-01 | End: 2017-01-01

## 2017-01-01 RX ORDER — LIDOCAINE 50 MG/G
1 PATCH TOPICAL EVERY 24 HOURS
Status: DISCONTINUED | OUTPATIENT
Start: 2017-01-01 | End: 2017-01-01

## 2017-01-01 RX ORDER — DIPHENHYDRAMINE HCL 25 MG
25 CAPSULE ORAL ONCE
Status: DISCONTINUED | OUTPATIENT
Start: 2017-01-01 | End: 2017-01-01

## 2017-01-01 RX ORDER — HEPARIN SODIUM AND DEXTROSE 10000; 5 [USP'U]/100ML; G/100ML
INJECTION INTRAVENOUS CONTINUOUS
Status: DISCONTINUED | OUTPATIENT
Start: 2017-01-01 | End: 2017-01-01

## 2017-01-01 RX ORDER — SCOLOPAMINE TRANSDERMAL SYSTEM 1 MG/1
1 PATCH, EXTENDED RELEASE TRANSDERMAL
Status: DISCONTINUED | OUTPATIENT
Start: 2017-01-01 | End: 2017-01-01

## 2017-01-01 RX ORDER — NALOXONE HYDROCHLORIDE 0.4 MG/ML
0.4 INJECTION, SOLUTION INTRAMUSCULAR; INTRAVENOUS; SUBCUTANEOUS ONCE
Status: COMPLETED | OUTPATIENT
Start: 2017-01-01 | End: 2017-01-01

## 2017-01-01 RX ORDER — FUROSEMIDE 10 MG/ML
20 INJECTION INTRAMUSCULAR; INTRAVENOUS ONCE
Status: COMPLETED | OUTPATIENT
Start: 2017-01-01 | End: 2017-01-01

## 2017-01-01 RX ORDER — DOPAMINE HYDROCHLORIDE 320 MG/100ML
INJECTION, SOLUTION INTRAVENOUS
Status: DISPENSED
Start: 2017-01-01 | End: 2017-01-01

## 2017-01-01 RX ORDER — PREDNISONE 20 MG/1
40 TABLET ORAL DAILY
Qty: 10 TABLET | Refills: 0 | Status: SHIPPED | OUTPATIENT
Start: 2017-01-01 | End: 2017-01-01

## 2017-01-01 RX ORDER — BUMETANIDE 0.25 MG/ML
INJECTION, SOLUTION INTRAMUSCULAR; INTRAVENOUS
Status: DISPENSED
Start: 2017-01-01 | End: 2017-01-01

## 2017-01-01 RX ORDER — ENOXAPARIN SODIUM 100 MG/ML
40 INJECTION SUBCUTANEOUS DAILY
Status: DISCONTINUED | OUTPATIENT
Start: 2017-01-01 | End: 2017-01-01

## 2017-01-01 RX ORDER — HYDROCODONE BITARTRATE AND ACETAMINOPHEN 7.5; 325 MG/1; MG/1
1 TABLET ORAL EVERY 6 HOURS PRN
Qty: 5 TABLET | Refills: 0 | Status: SHIPPED | OUTPATIENT
Start: 2017-01-01

## 2017-01-01 RX ORDER — RANITIDINE 25 MG/ML
50 INJECTION, SOLUTION INTRAMUSCULAR; INTRAVENOUS AS NEEDED
Status: CANCELLED | OUTPATIENT
Start: 2017-01-01

## 2017-01-01 RX ORDER — MELATONIN
325 EVERY EVENING
Status: DISCONTINUED | OUTPATIENT
Start: 2017-01-01 | End: 2017-01-01

## 2017-01-01 RX ORDER — ALBUMIN (HUMAN) 12.5 G/50ML
25 SOLUTION INTRAVENOUS ONCE
Status: COMPLETED | OUTPATIENT
Start: 2017-01-01 | End: 2017-01-01

## 2017-01-01 RX ORDER — METHYLPREDNISOLONE SODIUM SUCCINATE 125 MG/2ML
60 INJECTION, POWDER, LYOPHILIZED, FOR SOLUTION INTRAMUSCULAR; INTRAVENOUS ONCE
Status: COMPLETED | OUTPATIENT
Start: 2017-01-01 | End: 2017-01-01

## 2017-01-01 RX ORDER — GABAPENTIN 300 MG/1
600 CAPSULE ORAL 2 TIMES DAILY
Qty: 360 CAPSULE | Refills: 0 | Status: SHIPPED | OUTPATIENT
Start: 2017-01-01

## 2017-01-01 RX ORDER — ACETYLCYSTEINE 200 MG/ML
2 SOLUTION ORAL; RESPIRATORY (INHALATION)
Status: DISCONTINUED | OUTPATIENT
Start: 2017-01-01 | End: 2017-01-01

## 2017-01-01 RX ORDER — METOPROLOL SUCCINATE 25 MG/1
25 TABLET, EXTENDED RELEASE ORAL
Status: DISCONTINUED | OUTPATIENT
Start: 2017-01-01 | End: 2017-01-01

## 2017-01-01 RX ORDER — SPIRONOLACTONE 25 MG/1
25 TABLET ORAL DAILY
Qty: 30 TABLET | Refills: 5 | Status: SHIPPED | OUTPATIENT
Start: 2017-01-01 | End: 2017-01-01

## 2017-01-01 RX ORDER — FUROSEMIDE 10 MG/ML
40 INJECTION INTRAMUSCULAR; INTRAVENOUS
Status: DISCONTINUED | OUTPATIENT
Start: 2017-01-01 | End: 2017-01-01

## 2017-01-01 RX ORDER — TORSEMIDE 20 MG/1
40 TABLET ORAL DAILY
Qty: 90 TABLET | Refills: 5 | Status: SHIPPED | COMMUNITY
Start: 2017-01-01 | End: 2017-01-01

## 2017-01-01 RX ORDER — SODIUM CHLORIDE 450 MG/100ML
INJECTION, SOLUTION INTRAVENOUS CONTINUOUS
Status: DISCONTINUED | OUTPATIENT
Start: 2017-01-01 | End: 2017-01-01

## 2017-01-01 RX ORDER — LIDOCAINE HYDROCHLORIDE 10 MG/ML
INJECTION, SOLUTION INFILTRATION; PERINEURAL
Status: COMPLETED
Start: 2017-01-01 | End: 2017-01-01

## 2017-01-01 RX ORDER — FEBUXOSTAT 80 MG/1
1 TABLET, FILM COATED ORAL DAILY
Qty: 90 TABLET | Refills: 0 | Status: SHIPPED | OUTPATIENT
Start: 2017-01-01

## 2017-01-01 RX ORDER — HEPARIN SODIUM 5000 [USP'U]/ML
5000 INJECTION, SOLUTION INTRAVENOUS; SUBCUTANEOUS EVERY 8 HOURS SCHEDULED
Status: DISCONTINUED | OUTPATIENT
Start: 2017-01-01 | End: 2017-01-01

## 2017-01-01 RX ORDER — LORAZEPAM 2 MG/ML
0.5 INJECTION INTRAMUSCULAR EVERY 6 HOURS PRN
Status: DISCONTINUED | OUTPATIENT
Start: 2017-01-01 | End: 2017-01-01

## 2017-01-01 RX ORDER — BUPROPION HYDROCHLORIDE 150 MG/1
150 TABLET ORAL NIGHTLY
Qty: 90 TABLET | Refills: 1 | Status: SHIPPED | OUTPATIENT
Start: 2017-01-01

## 2017-01-01 RX ORDER — TRAMADOL HYDROCHLORIDE 50 MG/1
50 TABLET ORAL EVERY 6 HOURS PRN
Status: DISCONTINUED | OUTPATIENT
Start: 2017-01-01 | End: 2017-01-01

## 2017-01-01 RX ORDER — ASCORBIC ACID, THIAMINE, RIBOFLAVIN, NIACINAMIDE, PYRIDOXINE, FOLIC ACID, COBALAMIN, BIOTIN, PANTOTHENIC ACID 100; 1.5; 1.7; 20; 10; 1; 6; 300; 1 MG/1; MG/1; MG/1; MG/1; MG/1; MG/1; UG/1; UG/1; MG/1
1 TABLET, COATED ORAL
Status: DISCONTINUED | OUTPATIENT
Start: 2017-01-01 | End: 2017-01-01

## 2017-01-01 RX ORDER — TORSEMIDE 20 MG/1
60 TABLET ORAL DAILY
Qty: 90 TABLET | Refills: 5 | Status: SHIPPED | OUTPATIENT
Start: 2017-01-01 | End: 2017-01-01

## 2017-01-01 RX ORDER — TORSEMIDE 20 MG/1
20 TABLET ORAL DAILY
Qty: 90 TABLET | Refills: 5 | Status: SHIPPED | COMMUNITY
Start: 2017-01-01 | End: 2017-01-01

## 2017-01-01 RX ORDER — BUPROPION HYDROCHLORIDE 150 MG/1
150 TABLET, EXTENDED RELEASE ORAL NIGHTLY
Status: DISCONTINUED | OUTPATIENT
Start: 2017-01-01 | End: 2017-01-01

## 2017-01-01 RX ORDER — AZITHROMYCIN 250 MG/1
TABLET, FILM COATED ORAL
Qty: 6 TABLET | Refills: 0 | Status: SHIPPED | OUTPATIENT
Start: 2017-01-01 | End: 2017-01-01 | Stop reason: ALTCHOICE

## 2017-01-01 RX ORDER — TORSEMIDE 20 MG/1
TABLET ORAL
Qty: 120 TABLET | Refills: 3 | Status: SHIPPED | OUTPATIENT
Start: 2017-01-01 | End: 2017-01-01

## 2017-01-01 RX ORDER — DOPAMINE HYDROCHLORIDE 320 MG/100ML
5 INJECTION, SOLUTION INTRAVENOUS CONTINUOUS
Status: DISCONTINUED | OUTPATIENT
Start: 2017-01-01 | End: 2017-01-01

## 2017-01-01 RX ORDER — ALBUTEROL SULFATE 2.5 MG/3ML
2.5 SOLUTION RESPIRATORY (INHALATION) EVERY 6 HOURS PRN
Qty: 1 BOX | Refills: 3 | Status: SHIPPED | OUTPATIENT
Start: 2017-01-01

## 2017-01-01 RX ORDER — PROCHLORPERAZINE MALEATE 10 MG
10 TABLET ORAL EVERY 6 HOURS PRN
Qty: 30 TABLET | Refills: 3 | Status: SHIPPED | OUTPATIENT
Start: 2017-01-01

## 2017-01-01 RX ADMIN — BUMETANIDE 4 MG: 0.25 INJECTION, SOLUTION INTRAMUSCULAR; INTRAVENOUS at 11:58:00

## 2017-01-01 RX ADMIN — BUMETANIDE 4 MG: 0.25 INJECTION, SOLUTION INTRAMUSCULAR; INTRAVENOUS at 10:25:00

## 2017-01-01 RX ADMIN — DIPHENHYDRAMINE HCL 25 MG: 25 MG CAPSULE ORAL at 13:00:00

## 2017-01-01 RX ADMIN — BUMETANIDE 4 MG: 0.25 INJECTION, SOLUTION INTRAMUSCULAR; INTRAVENOUS at 15:53:00

## 2017-01-01 RX ADMIN — ACETAMINOPHEN 650 MG: 325 TABLET ORAL at 11:30:00

## 2017-01-01 RX ADMIN — ACETAMINOPHEN 650 MG: 325 TABLET ORAL at 13:00:00

## 2017-01-01 RX ADMIN — DIPHENHYDRAMINE HCL 25 MG: 25 MG CAPSULE ORAL at 08:43:00

## 2017-01-01 RX ADMIN — BUMETANIDE 4 MG: 0.25 INJECTION, SOLUTION INTRAMUSCULAR; INTRAVENOUS at 11:21:00

## 2017-01-17 NOTE — PROGRESS NOTES
[facility] Cardiac Health Progress Note    Stormy Allen is a 67year old male who presents to clinic for APN assessment and management of chronic systolic and diastolic heart failure and is functional class 3.      Subjective:  He retur blood glucose 4-5 times per day before meals, bedtime and for signs, symptoms of hypoglycemia or as ordered by physician, Disp: 1 kit, Rfl: 0  •  Glucose Blood (FREESTYLE TEST) In Vitro Strip, Medicare Only:  If on oral medications, test blood glucose once nightly., Disp: , Rfl:   •  Vitamin B-12 500 MCG Oral Tab, Take 500 mcg by mouth daily. , Disp: , Rfl:   •  Febuxostat 80 MG Oral Tab, Take by mouth daily. , Disp: , Rfl:   •  Paricalcitol 1 MCG Oral Cap, Take 1 mcg by mouth daily. , Disp: , Rfl:   •  B Compl 7.8% on 12/22. 8. Hx CHB - PPM dependent. 9. Knee arthritis - limiting activity; stable. 10. Recent hx lung cancer with chemo and radiation; last dose in May. Followed by Dr. Skye Yo. 11. Hx falls - none since last September.   12. Obesity - BMI 33.4; e

## 2017-01-30 NOTE — PROGRESS NOTES
[facility] reporting his weight is up 5 lbs over 10 days. AARON Clements, updated with new orders received to increase torsemide to 40 mg daily for the next 3 days with pt verbalizing understanding.   Will call back i

## 2017-02-06 NOTE — TELEPHONE ENCOUNTER
Pt asked for refill of Uloric to be sent to [facility] mail order pharmacy - requested 30 day supply w/ 3 refills.

## 2017-02-14 NOTE — PROGRESS NOTES
Pt here for 3 month MD f/u. Energy level is up and down. Appetite is good. Denies pain besides chronic arthritic pains in knees. Pt had CT scan 2/2/16. Pt has no further complaints.      Education Record    Learner:  Patient    Disease / Diagnosis:    Ernesto Wilson

## 2017-02-14 NOTE — PROGRESS NOTES
[facility] [place] Progress Note    Patient Name: Beka Burton   YOB: 1940   Medical Record Number: [id number]   CSN: [id number]   Attending Physician: Heidi Chery M.D.    Referring Physician: Constance Balderrama MD      Date of Visit: 2/14/2017 breakfast., Disp: 90 tablet, Rfl: 1  •  SPIRIVA RESPIMAT 2.5 MCG/ACT Inhalation Aero Soln, , Disp: , Rfl:   •  FREESTYLE SYSTEM Does not apply Kit, Medicare Only:  If on oral medications, test blood glucose once a day in the morning before breakfast If on Inject within 10 minutes before or after a meal, Disp: 5 pen, Rfl: 3  •  torsemide 20 MG Oral Tab, Take 1 tablet (20 mg total) by mouth daily. , Disp: 30 tablet, Rfl: 3  •  spironolactone 25 MG Oral Tab, Take 0.5 tablets (12.5 mg total) by mouth daily. , Dis clear  Neck: No palpable lymphadenopathy. Neck is supple. Lymphatics: There is no palpable lymphadenopathy   Chest: Diminished BS  Heart: Regular  Abdomen: Soft, non tender with good bowel sounds. No hepatosplenomegaly. No palpable mass.   Extremities: P CONTRAST      COMPARISON:  [facility], CT CHEST ABDOMEN (CPT=74150/99881), 11/02/2016, 8:58.      INDICATIONS:  C34.82 Malignant neoplasm of overlapping sites of left bronchus and lung      TECHNIQUE:  Unenhanced multislice CT scanning is performed through gynecomastia, multilevel degenerative disease in the thoracic and lumbar spine, left subclavian pacemaker, and mild stable substernal extension of the left lobe of the thyroid there is no adrenal mass or destructive bone lesion.      =====  CONCLUSION:

## 2017-02-28 NOTE — PROGRESS NOTES
[facility] Cardiac [facility] Progress Note    Tony Olea is a 65year old male who presents to clinic for APN assessment and management of chronic systolic and diastolic heart failure and is functional class 3.      Subjective:  He retur Ref Range: 4.0-13.0 x10(3) uL 6.9   Hemoglobin Latest Ref Range: 13.0-17.0 g/dL 10.4 (L)   Hematocrit Latest Ref Range: 37.0-53.0 % 31.1 (L)   Platelet Count Latest Ref Range: 150.0-450.0 10(3)uL 180.0   RBC Latest Ref Range: 3.80-5.80 x10(6)uL 2.80 (L) evening., Disp: 45 tablet, Rfl: 3  •  Paricalcitol (ZEMPLAR) 1 MCG Oral Cap, Take 1 capsule (1 mcg total) by mouth daily. , Disp: 90 capsule, Rfl: 0  •  simvastatin (ZOCOR) 40 MG Oral Tab, Take 40 mg by mouth nightly., Disp: , Rfl:   •  BuPROPion HCl ER, XL Inject 15 units into the skin daily and once steroids completed return to 10 units daily. , Disp: 5 pen, Rfl: 1  •  insulin aspart (NOVOLOG FLEXPEN) 100 UNIT/ML Subcutaneous Solution Pen-injector, Inject 1-68 Units into the skin 3 (three) times daily before regarding sodium restricted diet, low sodium foods, fluid restriction, daily weights, medication regimen, s/s HF exacerbation and when to call APN/clinic. Assessment:   1.  Chronic systolic and diastolic HF - close to euvolemic on exam with stable weight

## 2017-03-07 NOTE — PROGRESS NOTES
RN spoke to patient. Patient stated he feels fine, \"I feel ahead of the game\". Denies any needs or questions at this time. RN instructed pt to call the Clinic with any questions or needs.  Pt verbalized understanding of the instructions and stated he's

## 2017-03-13 NOTE — TELEPHONE ENCOUNTER
Pt states has had albuterol solution for the nebulizer that was given to the pt last in 2015. Pt does report worsening COPD symptoms that have been controlled with the inhalers.  The pt would just like a refill sent to have at home as needed since the p

## 2017-03-14 NOTE — PROGRESS NOTES
Spoke to Sulaiman at 10:15 on 03-14-17:  Sulaiman stated he has \"not been feeling good\" the past few days and has been seen by his Primary. No SOB, weight gain. Primary to continue to follow according to Sulaiman.

## 2017-03-21 PROBLEM — E11.40 DIABETIC NEUROPATHY (HCC): Status: RESOLVED | Noted: 2017-01-01 | Resolved: 2017-01-01

## 2017-03-21 PROBLEM — E11.42 DIABETIC POLYNEUROPATHY ASSOCIATED WITH TYPE 2 DIABETES MELLITUS (HCC): Status: ACTIVE | Noted: 2017-01-01

## 2017-03-21 PROBLEM — E11.40 DIABETIC NEUROPATHY (HCC): Status: ACTIVE | Noted: 2017-01-01

## 2017-03-21 NOTE — PROGRESS NOTES
HPI:    Patient ID: Milagros Meadows is a 65year old male. Cough  This is a new problem. Episode onset: 2 weeks. The problem has been gradually worsening. The problem occurs constantly. The cough is productive of sputum.  Associated symptoms include pedrito wheezing. Negative for hemoptysis. Cardiovascular: Negative for chest pain. Gastrointestinal: Negative for heartburn and abdominal pain. Endocrine: Negative for polydipsia, polyphagia and polyuria. Musculoskeletal: Negative for myalgias.    Skin: N day before mealsNon-Medicare:  Test blood glucose 4-5 times per day before meals, bedtime and for signs, symptoms of hypoglycemia or as ordered by physician Disp: 1 kit Rfl: 0   Glucose Blood (FREESTYLE TEST) In Vitro Strip Medicare Only: If on oral medica Complex-C-Folic Acid (NEPHRO-ARABELLA RX) 1 MG Oral Tab Take 1 tablet by mouth daily with breakfast. Disp:  Rfl:    ferrous sulfate 325 (65 FE) MG Oral Tab EC Take 325 mg by mouth every evening.    Disp:  Rfl:    Budesonide-Formoterol Fumarate 160-4.5 MCG/ACT I (primary encounter diagnosis)  Diabetic polyneuropathy associated with type 2 diabetes mellitus (hcc)  - finish z-Rasih, prednisone and check chest xray  - dm2 shoes forms filled out for pt.  Cont dm2 med therapy, foot care educ given to pt  No orders of the

## 2017-03-21 NOTE — PATIENT INSTRUCTIONS
Chronic Lung Disease: Preventing Lung Infections  Chronic lung diseases include chronic obstructive pulmonary disease (COPD), which includes chronic bronchitis and emphysema.  Other chronic lung diseases include pulmonary fibrosis, sarcoidosis, and other · If you smoke, think about quitting. In addition to causing or worsening many lung conditions, the lung damage from smoking increases your risk of infections. Stay away from others who smoke, too.  This is also harmful and increases your chance of infectio

## 2017-03-23 NOTE — TELEPHONE ENCOUNTER
Patient was seen 3/21/17 for COPD exacerbation/cough and was given Z-pack and Prednisone 20 mg x7 days. He states his cough has gotten worse, c/o \"hard cough\", chest congestion which he cannot bring up. Patient has not tried anything over the counter.

## 2017-03-23 NOTE — TELEPHONE ENCOUNTER
Please call patient  Patient came in and saw Rachael Kay couple days ago but patient is not getting any better.

## 2017-03-23 NOTE — TELEPHONE ENCOUNTER
Dr. Lakeisha Petersen recommends Cheratussin as needed for cough. Patient should finish Zpak and Prednisone as directed and follow up in the office should his symptoms persist or worsen. Patient should also take a stool softener to prevent constipation.      Spoke with

## 2017-03-28 NOTE — TELEPHONE ENCOUNTER
Reviewed the treatment plan with the pt in detail. Pt states already has prednisone 10mg at home. Reviewed the tapering dose instructions. Pt did confirm there were enough pills for this at home. Rx was added into the pt's chart but not sent to a pharmacy.

## 2017-03-28 NOTE — TELEPHONE ENCOUNTER
Pt still has a lot of congestion even after taking medicine for the past week - wants to speak w/ a nurse to advise tx.

## 2017-03-28 NOTE — TELEPHONE ENCOUNTER
Pt was seen in the office on 3/21/2017 c/o cough and chest congestion. The pt was treated with a z-pack, prednisone (20mg daily for 7 days) and a chest x-ray was ordered.      X-ray results did confirm COPD exacerbation:   COPD with stable scarring/fibrosis

## 2017-03-28 NOTE — TELEPHONE ENCOUNTER
Left message for pt to give the office a call back. Per Mandi Segura, pt is to take prednisone 20mg for 3 days and 10mg for 3 days. It is ok for the pt to take Mucinex OTC (not Mucinex-D). Would also like to review pt's BS readings at home.

## 2017-05-09 NOTE — PROGRESS NOTES
[facility] Cardiac Health Progress Note    Pilo Peñaloza is a 65year old male who presents to clinic for APN assessment and management of chronic systolic and diastolic heart failure and is functional class 3.      Subjective:  He retur kg)  03/21/17 : 260 lb (117.935 kg)  02/28/17 : 268 lb 8 oz (121.791 kg)  02/14/17 : 260 lb (117.935 kg)  01/17/17 : 260 lb 1.6 oz (117.981 kg)  12/05/16 : 259 lb 6.4 oz (117.663 kg)      Current outpatient prescriptions:   •  Paricalcitol (ZEMPLAR) 1 MCG every 7 grams of carbohydrates eaten Inject within 10 minutes before or after a meal, Disp: 5 pen, Rfl: 3  •  spironolactone 25 MG Oral Tab, Take 0.5 tablets (12.5 mg total) by mouth daily. , Disp: 15 tablet, Rfl: 3  •  gabapentin 100 MG Oral Cap, Take 600 intolerance. 6. CKD stage 3 - baseline creatinine is approximately1.5-1.7 mg/dl, stable  7. 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 Hx CHB - PPM dependent. 9. OA - limiting activity; stable.   10. Hx of lung cancer with chemo and

## 2017-05-15 NOTE — TELEPHONE ENCOUNTER
Pt is c/o bilateral wrist, knee and back pain. The pt has been using tylenol in the past and this is no longer helping with the pain.    The pt has been using OTC tylenol for \"years\" and has also used voltaren in the past.     Will discuss with Dr. Hodan sotomayor

## 2017-05-15 NOTE — TELEPHONE ENCOUNTER
Please call patient has been taking arthritis tylenol to help with his joints. It isn't helping anymore would like something else with the help.

## 2017-06-08 NOTE — TELEPHONE ENCOUNTER
Called with CT results. This showed enlargement of paratracheal node. Recommended PET scan. He will call to schedule.

## 2017-06-13 NOTE — PATIENT INSTRUCTIONS
Shingles (Herpes Zoster)     The shingles rash usually appears on just one side of the body. Shingles is also called herpes zoster. It is a painful skin rash caused by the herpes zoster virus. This is the same virus that causes chickenpox.  After a pers For most people, shingles heals on its own in a few weeks.  But treatment is recommended to help relieve pain, speed healing, and reduce the risk of complications. Antiviral medicines are prescribed within the first 72 hours of the appearance of the rash. T You can only get shingles if you have had chicken pox in the past. Those who have never had chickenpox can get the virus from you. Although instead of developing shingles, the person may get chickenpox.  Until your blisters form scabs, avoid contact with ot

## 2017-06-13 NOTE — PROGRESS NOTES
HPI:    Patient ID: Irish Post is a 65year old male. Chest Pain   This is a new problem. The current episode started yesterday. The onset quality is sudden. The problem occurs constantly. The problem has been unchanged.  Pain location: right later (ZEMPLAR) 1 MCG Oral Cap Take 1 capsule (1 mcg total) by mouth daily. Disp: 90 capsule Rfl: 3   albuterol sulfate (2.5 MG/3ML) 0.083% Inhalation Nebu Soln Take 3 mL (2.5 mg total) by nebulization every 6 (six) hours as needed for Wheezing.  Disp: 1 Box Rfl: daily with breakfast. Disp:  Rfl:    ferrous sulfate 325 (65 FE) MG Oral Tab EC Take 325 mg by mouth every evening. Disp:  Rfl:    Budesonide-Formoterol Fumarate 160-4.5 MCG/ACT Inhalation Aerosol Inhale 2 puffs into the lungs 2 (two) times daily.  Disp: COMPLETE  CARD NUC STRESS TEST Krystle GONZALEZ#[id number]

## 2017-06-19 NOTE — TELEPHONE ENCOUNTER
Spoke with patient and he c/o SOB that worsens with activity, he has been on 2.5 L of O2. Denies chest pain. Patient is requesting a refill on the medications Dr. Bradley Preciado gave him last time he had a flare up.    Patient is still on famciclovir for his shing

## 2017-06-19 NOTE — TELEPHONE ENCOUNTER
Pt called and said he is having a COPD \"flare up\" and needs some medication - please call to advise.

## 2017-06-26 NOTE — TELEPHONE ENCOUNTER
Called with PET results- unfortunately confirms recurrent disease and now incurable. CT ordered of head to complete w/u. Will further discuss including next step at scheduled appointment.

## 2017-06-27 NOTE — PROGRESS NOTES
HPI:    Patient ID: Osmany Tomas is a 65year old male. Pt here for f/u of right sided chest pain x 3 weeks. Empirically treated for Shingles with antiviral 2 weeks ago and pt states the pain did decrease but now is back rating a 6/10.  Endorses SOB, a Oral Tab Take 81 mg by mouth daily.  Three times weekly Disp:  Rfl:    Pantoprazole Sodium 40 MG Oral Tab EC Take 1 tablet (40 mg total) by mouth every morning before breakfast. Disp: 90 tablet Rfl: 2   simvastatin (ZOCOR) 40 MG Oral Tab Take 1 tablet (40 m 500 MCG Oral Tab Take 500 mcg by mouth daily. Disp:  Rfl:    B Complex-C-Folic Acid (NEPHRO-ARABELLA RX) 1 MG Oral Tab Take 1 tablet by mouth daily with breakfast. Disp:  Rfl:    ferrous sulfate 325 (65 FE) MG Oral Tab EC Take 325 mg by mouth every evening. PRN NORCO FOR MUSCULOSKELETAL PAIN. REASSURED PT THAT I FEEL THIS PAIN IS NOT FROM HIS KNOWN LUNG CANCER AS THERE WAS NOTHING ON PET SCAN TO CONFIRM BONY METS  No orders of the defined types were placed in this encounter.       Meds This Visit:  Signed Pres

## 2017-06-27 NOTE — PATIENT INSTRUCTIONS
Noncardiac Chest Pain    Based on your visit today, the health care provider doesn’t know what is causing your chest pain. In most cases, people who come to the emergency department with chest pain don’t have a problem with their heart.  Instead, the pain © 9979-0377 [address], [address] [place]. All rights reserved. This information is not intended as a substitute for professional medical care. Always follow your healthcare professional's instructions.

## 2017-06-29 NOTE — PROGRESS NOTES
[facility] Cardiac Health Progress Note    Stuart Gudino is a 65year old male who presents to clinic for APN assessment and management of chronic systolic and diastolic heart failure and is functional class 3.      Subjective:  He retur Encounters:  06/29/17 : 279 lb 3.2 oz (126.6 kg)  06/26/17 : 262 lb (118.8 kg)  06/13/17 : 260 lb (117.9 kg)  05/09/17 : 265 lb 4.8 oz (120.3 kg)  03/21/17 : 260 lb (117.9 kg)  02/28/17 : 268 lb 8 oz (121.8 kg)      Current Outpatient Prescriptions:   •  t 1-68 Units into the skin 3 (three) times daily before meals.  Inject 1 unit of insulin for every 30 points blood glucose is greater than 140 mg/dL Inject insulin into the skin prior to meals  Inject 1 unit of insulin for every 7 grams of carbohydrates eaten CKD stage 3 - baseline creatinine is approximately1.5-1.7 mg/dl, stable  7. 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 Hx CHB - PPM dependent. 9. OA - limiting activity; stable.   10. Hx of lung cancer with chemo and radiation; Pet s

## 2017-07-03 NOTE — PROGRESS NOTES
[facility] for Cardiac Health Progress Note    Amie Jordan is a 65year old male who presents to clinic for APN assessment and management of chronic systolic and diastolic heart failure and is functional class 3.      Subjective:  He retur Take 1 tablet (40 mg total) by mouth every morning before breakfast., Disp: 90 tablet, Rfl: 2  •  simvastatin (ZOCOR) 40 MG Oral Tab, Take 1 tablet (40 mg total) by mouth nightly., Disp: 90 tablet, Rfl: 2  •  BuPROPion HCl ER, XL, 150 MG Oral Tablet 24 Hr, •  ferrous sulfate 325 (65 FE) MG Oral Tab EC, Take 325 mg by mouth every evening.  , Disp: , Rfl:   •  Budesonide-Formoterol Fumarate 160-4.5 MCG/ACT Inhalation Aerosol, Inhale 2 puffs into the lungs 2 (two) times daily. , Disp: , Rfl:   •  Tiotropium Br per PCP    Plan:   · Venofer 200 mg IVP x 1  · Diuril 500 mg IVP x 1  · Bumex 4 mg IVP x 1  · Continue higher dose of torsemide for now  · Return to the clinic on Friday, July 6th  · F/U with Dr Jesu Hewitt on 10/31/17 with 2 D echo prior to visit  · CHF disch

## 2017-07-06 NOTE — PATIENT INSTRUCTIONS
To reach Dr Rakan Granger nurse during business hours, please call [phone]. After hours, including weekends, evenings, and holidays, please call the main number [phone] for emergent needs.

## 2017-07-06 NOTE — PROGRESS NOTES
Education Record    Learner:  Patient    Disease / Geovanna Harman cancer    Barriers / Limitations:  None    Method:  Brief focused, printed material and  reinforcement    General Topics:  Plan of care reviewed.  New chemo ed given to pt to and wife    Emerald Dubois

## 2017-07-06 NOTE — PROGRESS NOTES
Chemotherapy Education    Patient:Jayme James Jessica  Date: 7/6/17  Barriers / Limitations:  None  Diagnosis: lung cancer  Caregivers present: spouse    Drug names:  Carboplatin day 1 every 21 days  Gemzar day 1 day 8 every 21 days    Approximate treatment wilfredo on Reproductive System:  Avoid pregnancy / use of barrier birth control methods:   Possible sterility, impotence, changes in sex drive:   Menstrual irregularity and vaginal dryness  Comments.     Vesicants / Irritants:  Potential extravasation at site of ad

## 2017-07-07 NOTE — ADDENDUM NOTE
Encounter addended by: Feli Nguyen RN on: 7/7/2017 11:31 AM<BR>    Actions taken: Home Medications modified, Order Reconciliation Section accessed

## 2017-07-07 NOTE — TELEPHONE ENCOUNTER
Date of Treatment: 7/6/17                                Type of Chemo: Carbo/[place]    Comments: \" I felt a little anxious last night, I took a xanax and was able to sleep. \" Pt states he was facing the emotional reality of starting chemo again last night a

## 2017-07-07 NOTE — PROGRESS NOTES
[facility] Cardiac Health Progress Note    Milagros Meadows is a 65year old male who presents to clinic for APN assessment and management of chronic systolic and diastolic heart failure and is functional class 3.      Subjective:  He retur Range:  3.5 - 4.8 g/dL 2.6 (L)   WBC Latest Ref Range: 4.0 - 13.0 x10(3) uL 7.2   Hemoglobin Latest Ref Range: 13.0 - 17.0 g/dL 10.2 (L)   Hematocrit Latest Ref Range: 37.0 - 53.0 % 31.5 (L)   Platelet Count Latest Ref Range: 150.0 - 450.0 10(3)uL 231.0   R tablet (8 mg total) by mouth every 8 (eight) hours as needed for Nausea., Disp: 30 tablet, Rfl: 3  •  hydrocodone-acetaminophen (NORCO) 7.5-325 MG Oral Tab, Take 1 tablet by mouth every 6 (six) hours as needed for Pain., Disp: 90 tablet, Rfl: 0  •  aspirin Inject within 10 minutes before or after a meal, Disp: 5 pen, Rfl: 3  •  Vitamin B-12 500 MCG Oral Tab, Take 500 mcg by mouth daily. , Disp: , Rfl:   •  B Complex-C-Folic Acid (NEPHRO-ARABELLA RX) 1 MG Oral Tab, Take 1 tablet by mouth daily with breakfast., Dis chemo and radiation; Pet scan on 6/20/17 with recurrent disease. Follows with Dr Jake Howard. 11. Hx falls - none since last September  12. Obesity - BMI 36; encourage heart healthy diet and exercise.   13. Anemia with CKD/iron deficiency - HGB is over 10 g/dl

## 2017-07-10 NOTE — PROGRESS NOTES
Pt called to report having increased sob and that his weight has not decreased, despite IV diuretics received in the clinic in the last couple weeks. AARON Alcazar, informed with new orders received for metolazone that was sent to his pharmacy.   We t

## 2017-07-10 NOTE — PROGRESS NOTES
Mr. Michelle Guevara called about a 4 pound weight gain over the weekend and increased shortness of breath. I called in Metolazone 2.5mg and told him to take daily. We call him Wednesday to check his weight.      AARON Bright  7/10/2017  9:07 AM  [place]

## 2017-07-11 NOTE — PROGRESS NOTES
Mr. Daigle Like called and reported Weight as 259 lbs ( 9 lbs weight loss) since he took one dose of Metolazone 2.5 mg yesterday per order .  Heart Failure symptoms reported as SOB slightly better than yesterday, leg edema Still present however better

## 2017-07-12 NOTE — PROGRESS NOTES
Spoke with Goran Can on the phone who reported his weight today was 256 lbs. He has lost a total of 14 pounds. Today he did not take his metolazone stating \"I feel like my breathing is better, not as labored.   I feel like I'm losing too much weight in such

## 2017-07-13 NOTE — PROGRESS NOTES
CC:Patient presents with: Follow - Up: day 8 gemzar    Current regimen  Carboplatin day 1 every 21 days  Gemzar day 1 day 8 every 21 days      HPI:   Marcos Ulrich is a(n) 65year old male followed by Dr. Flores Cruz for lung cancer.   He was originally faiza Grossly intact. Lymphatics: There is no palpable lymphadenopathy throughout in the cervical,            supraclavicular, axillary, or inguinal regions.   Psych/Depression: normal    LABS    Lab Results  Component Value Date   WBC 3.8 07/13/2017   RBC 2.72

## 2017-07-13 NOTE — PROGRESS NOTES
Education Record    Learner:  Patient    Disease / Kristen Valencia neoplasm of overlapping sites of left lung    Barriers / Limitations:  None   Comments:    Method:  Brief focused   Comments:    General Topics:  Infection, Medication, Pain, Precaution

## 2017-07-14 NOTE — PROGRESS NOTES
[facility] Cardiac Health Progress Note    Kumar May is a 65year old male who presents to clinic for APN assessment and management of chronic systolic and diastolic heart failure and is functional class 3.      Subjective:  He retur Latest Ref Range: 11.5 - 16.0 % 13.8   RDW-SD Latest Ref Range: 35.1 - 46.3 fL 51.7 (H)   Prelim Neutrophil Abs Latest Ref Range: 1.30 - 6.70 x10 (3) uL 3.04   Neutrophils Absolute Latest Ref Range: 1.30 - 6.70 x10(3) uL 3.04   Lymphocytes Absolute Latest by nebulization every 6 (six) hours as needed for Wheezing., Disp: 1 Box, Rfl: 3  •  Febuxostat 80 MG Oral Tab, Take 1 tablet by mouth daily. , Disp: 90 tablet, Rfl: 1  •  Metoprolol Succinate ER 25 MG Oral Tablet 24 Hr, Take 0.5 tablets (12.5 mg total) by soft, non-tender, BS+  Extremities:    1+ LE edema  Neuro:             A&O x 3, PECK  Skin:                warm, dry    Education:  Patient instructed regarding sodium restricted diet, low sodium foods, fluid restriction, daily weights, medication regimen,

## 2017-07-17 PROBLEM — R53.1 WEAKNESS: Status: ACTIVE | Noted: 2017-01-01

## 2017-07-17 PROBLEM — E87.1 HYPONATREMIA: Status: ACTIVE | Noted: 2017-01-01

## 2017-07-17 NOTE — PROGRESS NOTES
ANP Visit Note    Patient Name: Osmany Tomas   YOB: 1940   Medical Record Number: [id number]   CSN: [id number]   Date of visit: 7/17/2017       Chief Complaint/Reason for Visit:  Patient presents with:   Follow - Up: Lung Ca patient here for • Arrhythmia     bradycardia, s/p pacer now   • Cancer ([facility] [place] [address] 1/2016    lung chemo/radiation supposed to start 3/22/16   • CONGESTIVE HEART FAILURE    • Contact dermatitis and other eczema, due to unspecified cause    • COPD    • COPD (chronic obstructiv Mother        Social History:    Social History  Social History   Marital status:   Spouse name: N/A    Years of education: N/A  Number of children: N/A     Occupational History  retired       Social History Main Topics   Smoking status: Former Smok daily., Disp: 90 capsule, Rfl: 3  •  Febuxostat 80 MG Oral Tab, Take 1 tablet by mouth daily. , Disp: 90 tablet, Rfl: 1  •  Metoprolol Succinate ER 25 MG Oral Tablet 24 Hr, Take 0.5 tablets (12.5 mg total) by mouth every evening., Disp: 45 tablet, Rfl: 3  • hours as needed for Wheezing., Disp: 1 Box, Rfl: 3  •  SPIRIVA RESPIMAT 2.5 MCG/ACT Inhalation Aero Soln, , Disp: , Rfl:   •  Albuterol Sulfate HFA (PROAIR HFA) 108 (90 BASE) MCG/ACT Inhalation Aero Soln, Inhale 2 puffs into the lungs every 4 (four) hours CO2 25.0 22.0 - 32.0 mmol/L   -CBC W/ DIFFERENTIAL   Collection Time: 07/17/17 11:56 AM   Result Value Ref Range   WBC 5.4 4.0 - 13.0 x10(3) uL   RBC 2.55 (L) 3.80 - 5.80 x10(6)uL   HGB 8.6 (L) 13.0 - 17.0 g/dL   HCT 25.5 (L) 37.0 - 53.0 %   PLT 34.0 (L)

## 2017-07-17 NOTE — PROGRESS NOTES
Education Record    Learner:  Patient and Spouse    Disease / Diagnosis:Weakness/ IV hydration    Barriers / Limitations:  None   Comments:    Method:  Brief focused   Comments:    General Topics:  Infection, Medication, Pain, Precautions, Procedure, Side

## 2017-07-17 NOTE — PROGRESS NOTES
Vanessa Roberts called the [facility] to let us know that he recieved fluids in the [facility] today. He denies feeling lightheaded or dizzy. He was questioning whether he should take his diuretics or not.  He has felt \"out of sorts\" and his mouth has been sore lately

## 2017-07-17 NOTE — PROGRESS NOTES
Education Record    Learner:  Patient    Disease / Diagnosis: Lung Ca    Barriers / Limitations:  None   Comments:    Method:  Brief focused and Reinforcement   Comments:    General Topics:  Plan of care reviewed   Comments:    Outcome:  Shows understandin

## 2017-07-20 NOTE — PROGRESS NOTES
PT [facility] TODAY FOR TRANSFUSION OF 1 UNIT OF PLATELETS AND 1 UNIT OF PRBC'S. PT REFUSED IV LASIX, BECAUSE HE STATED THE NURSE IN [address] [place] [address] \"DRY\" TODAY AND SHOULD NOT TAKE ANY DIURETICS AT THIS  TIME.   B/P STABLE THRU OUT T

## 2017-07-20 NOTE — PATIENT INSTRUCTIONS
YOU RECEIVED 1 UNIT OF PLATELETS FOR PLATELET COUNT OF 4,523 AND 1 UNIT OF PACKED RED BLOOD CELLS FOR HEMOGLOBIN OF 7.5. Post Transfusion Instructions for Out-Patients    Most recipients of blood transfusions do not experience any adverse effects.   You

## 2017-07-20 NOTE — PROGRESS NOTES
ANP Visit Note    Patient Name: Zenia Gold   YOB: 1940   Medical Record Number: [id number]   CSN: [id number]   Date of visit: 7/20/2017       Chief Complaint/Reason for Visit:  Lung Cancer, Anemia, Thrombocytopenia    Oncologic History: unspecified cause    • COPD    • COPD (chronic obstructive pulmonary disease) (HCC)    • COPD bronchitis    • DIABETES    • Esophageal reflux    • Essential hypertension    • Exposure to radiation    • Fitting and adjustment of cardiac pacemaker    • Gangl Social History Main Topics   Smoking status: Former Smoker  1.50 Packs/day  For 45.00 Years     Quit date: 4/1/2006    Smokeless tobacco: Never Used    Alcohol use No    Comment: MAYBE ONCE A YEAR    Drug use: No    Sexual activity: Not on file     Oth 1  •  gabapentin 300 MG Oral Cap, Take 2 capsules (600 mg total) by mouth 2 (two) times daily. , Disp: 360 capsule, Rfl: 0  •  Paricalcitol (ZEMPLAR) 1 MCG Oral Cap, Take 1 capsule (1 mcg total) by mouth daily. , Disp: 90 capsule, Rfl: 3  •  albuterol sulfat Soln, Inhale 2 puffs into the lungs every 4 (four) hours as needed for Wheezing., Disp: 1 Inhaler, Rfl: 6    Review of Systems:  A comprehensive 14 point review of systems was completed. Pertinent positives and negatives noted in the the HPI.     Performan Date: 07/20/2017  Value: 3.6         Ref range: 3.6 - 5.1 mmol/L   Status: Final  Chloride                                      Date: 07/20/2017  Value: 95*         Ref range: 101 - 111 mmol/L   Status: Final  CO2 07/20/2017  Value: 0.59*       Ref range: 1.30 - 6.70 x10(*  Status: Final  Lymphocyte Absolute                           Date: 07/20/2017  Value: 0.16*       Ref range: 0.90 - 4.00 x10(*  Status: Final  Monocyte Absolute                             Date: Date: 07/20/2017  Value: Small*      Ref range: (none)             Status: Final  Macrocytosis                                  Date: 07/20/2017  Value: Small*      Ref range: (none)             Status: Final  ------------    Impression/Plan:

## 2017-07-20 NOTE — PROGRESS NOTES
[facility] Cardiac Health Progress Note    Cely Mcmahan is a 65year old male who presents to clinic for APN assessment and management of chronic systolic and diastolic heart failure and is functional class 3.      Subjective:  He retur Prochlorperazine Maleate (COMPAZINE) 10 mg tablet, Take 1 tablet (10 mg total) by mouth every 6 (six) hours as needed for Nausea., Disp: 30 tablet, Rfl: 3  •  Ondansetron HCl (ZOFRAN) 8 MG tablet, Take 1 tablet (8 mg total) by mouth every 8 (eight) hours a meals. Inject 1 unit of insulin for every 30 points blood glucose is greater than 140 mg/dL Inject insulin into the skin prior to meals  Inject 1 unit of insulin for every 7 grams of carbohydrates eaten Inject within 10 minutes before or after a meal, Disp stage 3 - baseline creatinine is approximately1.5-1.7 mg/dl, improving with diuresis  7. 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 Hx CHB - PPM dependent. 9. OA - limiting activity; stable.   10. Hx of lung cancer with chemo and rad

## 2017-07-25 PROBLEM — C34.82 MALIGNANT NEOPLASM OF OVERLAPPING SITES OF LEFT LUNG (HCC): Status: ACTIVE | Noted: 2017-01-01

## 2017-07-25 NOTE — PROGRESS NOTES
[facility] Progress Note    Patient Name: Marcos Ulrich   YOB: 1940   Medical Record Number: [id number]   CSN: [id number]   Attending Physician: Hugo Millan M.D.    Referring Physician: Panda Almanzar MD      Date of Visit: 7/25/2017 Tab, Take 1 tablet (25 mg total) by mouth daily. , Disp: 30 tablet, Rfl: 5  •  Prochlorperazine Maleate (COMPAZINE) 10 mg tablet, Take 1 tablet (10 mg total) by mouth every 6 (six) hours as needed for Nausea., Disp: 30 tablet, Rfl: 3  •  Ondansetron HCl (ZO Solution Pen-injector, Inject 1-68 Units into the skin 3 (three) times daily before meals.  Inject 1 unit of insulin for every 30 points blood glucose is greater than 140 mg/dL Inject insulin into the skin prior to meals  Inject 1 unit of insulin for every unspecified site    • OTHER DISEASES     periph neuropathy legs   • Pain in joint, lower leg    • Personal history of antineoplastic chemotherapy    • Personal history of arthritis    • Pneumonia, organism unspecified(486)    • RENAL DISEASE    • Renal dis Location: Right arm, Patient Position: Sitting, Cuff Size: large)   Pulse 85   Temp 97.8 °F (36.6 °C) (Tympanic)   Resp 20   Wt 121 kg (266 lb 11.2 oz)   SpO2 98%   PF 95 L/min   BMI 37.21 kg/m²       Physical Examination:  General: Patient is alert and or Ref Range: 80.0 - 99.0 fL 97.7   RDW Latest Ref Range: 11.5 - 16.0 % 13.8   RDW-SD Latest Ref Range: 35.1 - 46.3 fL 49.8 (H)   Prelim Neutrophil Abs Latest Ref Range: 1.30 - 6.70 x10 (3) uL 0.41 (LL)   Neutrophils Absolute Latest Ref Range: 1.30 - 6.70 x10 Emotional Well Being:  I have assessed the patient's emotional well-being and any concerns about anxiety or depression. We discussed issues of distress, coping difficulties and social support systems and currently there are no active problems.      Mar

## 2017-07-25 NOTE — PROGRESS NOTES
Pt here for 1 month MD curtis/luzma and due for chemo. Energy level is very poor, pt is very weak and tired. Pt gets SOB with very little exertion, worse than it has been. Pt uses O2 PRN, doesn't feel it helps much.  Pt notes after chemo having sores in his mouth an

## 2017-07-26 NOTE — TELEPHONE ENCOUNTER
Right shoulder Xray was negative, pain is worse with movement, will take PRednisone 20 mg today and for 3 days, will call ortho Dr Love Pacheco for evaluation.

## 2017-07-26 NOTE — PROGRESS NOTES
Education Record  Learner:  Patient  Disease / Diagnosis:   Lung cancer  Barriers / Limitations:  None  Method:  Printed material and Reinforcement  General Topics:  Plan of care reviewed; post transfusion instructions; fluids; safety  Outcome:  Shows unde

## 2017-07-26 NOTE — PROGRESS NOTES
Patient here today for transfusion. States he has had pain in his L shoulder for some time. Yesterday, he started having pain in R shoulder. Does not recall pulling it, bumping it, sleeping on it wrong. Can barely move it. Pain between a 7-8.   His sp

## 2017-07-28 NOTE — PROGRESS NOTES
[facility] Cardiac Health Progress Note    Alber Gamez is a 65year old male who presents to clinic for APN assessment and management of chronic systolic and diastolic heart failure and is functional class 3.      Subjective:  He retur Latest Ref Range: 150.0 - 450.0 10(3)uL 42.0 (L)   RBC Latest Ref Range: 3.80 - 5.80 x10(6)uL 2.18 (L)   MCH Latest Ref Range: 27.0 - 33.2 pg 33.5 (H)   MCHC Latest Ref Range: 31.0 - 37.0 g/dL 34.3   MCV Latest Ref Range: 80.0 - 99.0 fL 97.7   RDW Latest R mouth every 6 (six) hours as needed for Nausea., Disp: 30 tablet, Rfl: 3  •  Ondansetron HCl (ZOFRAN) 8 MG tablet, Take 1 tablet (8 mg total) by mouth every 8 (eight) hours as needed for Nausea., Disp: 30 tablet, Rfl: 3  •  hydrocodone-acetaminophen (Delroy Jauregui 140 mg/dL Inject insulin into the skin prior to meals  Inject 1 unit of insulin for every 7 grams of carbohydrates eaten Inject within 10 minutes before or after a meal, Disp: 5 pen, Rfl: 3  •  Vitamin B-12 500 MCG Oral Tab, Take 500 mcg by mouth daily. , D 7.8% on 12/22/16  8. Hx CHB - PPM dependent. 9. OA - limiting activity; stable. 10. Hx of lung cancer with chemo and radiation; Pet scan on 6/20/17 with recurrent disease. Follows with Dr Rose De La Torre, on palliative chemo now  6.  Hx falls - none since last S

## 2017-08-01 NOTE — PROGRESS NOTES
Pt here for 1 week APN f/u. Pt notes not feeling much better than last week. Energy level is still very low, unable to do much. Pt notes SOB with minor activity, uses O2 at home with no benefit to breathing.  Pt notes shoulder pain still there, pt finished

## 2017-08-01 NOTE — PROGRESS NOTES
ANP Visit Note    Patient Name: Daog Hinojosa   YOB: 1940   Medical Record Number: [id number]   CSN: [id number]   Date of visit: 8/1/2017       Chief Complaint/Reason for Visit:  Follow up chemo      History of Present Illness: Patient of  hypertension    • Exposure to radiation    • Fitting and adjustment of cardiac pacemaker    • Ganglion, unspecified     GLOBAL EXP / 07/07/2014 / RIGHT HENNA / LUIS 3/31/2014   • High blood pressure    • High cholesterol    • History of blood transfusion use No    Comment: MAYBE ONCE A YEAR    Drug use: No    Sexual activity: Not on file     Other Topics Concern    Caffeine Concern Yes    Comment: 1 cup/2 weeks 4-5 diet pepsi a day    Seat Belt Yes     Social History Narrative   None on file       [facility] 3  •  albuterol sulfate (2.5 MG/3ML) 0.083% Inhalation Nebu Soln, Take 3 mL (2.5 mg total) by nebulization every 6 (six) hours as needed for Wheezing., Disp: 1 Box, Rfl: 3  •  Febuxostat 80 MG Oral Tab, Take 1 tablet by mouth daily. , Disp: 90 tablet, Rfl: the HPI. Performance Status: ECOG 2    Physical Examination:  General: Patient is alert and oriented x 3, not in acute distress.   Vital Signs: /71 (BP Location: Right arm, Patient Position: Sitting, Cuff Size: large)   Pulse 84   Temp (!) 97.5 °F FNP-BC  Nurse Practitioner  [facility]

## 2017-08-04 NOTE — PLAN OF CARE
[facility] called clinic back and Torsemide was also increased to 40 one time today with Sprionolactone 12.5 daily. If patient continues to see an increase in wt over the weekend he has been instructed to continue 40 of torsemide daily until Monday.  He should als

## 2017-08-04 NOTE — PROGRESS NOTES
Call that he is up 4 lbs and noticing edema. Added back spironolactone 12.5 mg daily. BMP in one week.      Johnell Meigs, 08/04/17, 10:08 AM

## 2017-08-04 NOTE — PROGRESS NOTES
Patient called with wt gain and APN made aware of the 4 lb wt gain. Patient was instructed to restart Spironolactone 12.5 mg daily and obtain labs next week at [facility] appt.  Patient expressed that there were orders already in the computer and this was verif

## 2017-08-07 NOTE — PROGRESS NOTES
I called Mr. Stormy Allen to verify his diuretic dosages. He reported that he took a dose Of Metalozone today since he was up in weight. He is no longer on this medication but took it because he thought it would help with his swelling.  He was unsure if

## 2017-08-07 NOTE — PROGRESS NOTES
Spoke to Sulaiman at 12:40 on 08-07-17  Weight today 266 Lbs  Weight yesterday (08-06-17) 266 lbs. Sulaiman stated that he will call the [facility] with any increase in weights.

## 2017-08-07 NOTE — TELEPHONE ENCOUNTER
Pt requesting 90 day supply refills of gabapentin 300mg tab and Uloric 80mg tab [facility].

## 2017-08-07 NOTE — PROGRESS NOTES
Received call back from Sulaiman @ 1:00 pm on 08-07-17  Sulaiman expressed confusion regarding his medications.    Contacted Anamaria WALKER who stated she would call Sulaiman back and review medication instructions and dosage

## 2017-08-08 NOTE — PROGRESS NOTES
Labs reviewed and stable after adding back Spironolactone. Results for Charbel Jules (MRN [id number]) as of 8/8/2017 17:42   Ref.  Range 8/8/2017 10:32   Glucose Latest Ref Range: 70 - 99 mg/dL 209 (H)   Sodium Latest Ref Range: 136 - 144 mmol/L 137

## 2017-08-08 NOTE — PROGRESS NOTES
Pt here for 2 week MD curtis/luzma and due to start immunotherapy. Pt states he doesn't feel much better than last week. Energy level is still very low, has muscle fatigue/burning in thighs when walking. Appetite is low, has to force himself to eat.  Pt notes contin

## 2017-08-14 NOTE — PROGRESS NOTES
[facility] Cardiac Health Progress Note    Zenia Gold is a 65year old male who presents to clinic for APN assessment and management of chronic systolic and diastolic heart failure and is functional class 3.      Subjective:  He retur 63 U/L 16 (L)   Total Bilirubin Latest Ref Range: 0.1 - 2.0 mg/dL 0.5   TOTAL PROTEIN Latest Ref Range: 6.1 - 8.3 g/dL 6.4   Albumin Latest Ref Range: 3.5 - 4.8 g/dL 2.8 (L)   TSH Latest Ref Range: 0.350 - 5.500 mIU/mL 1.510   WBC Latest Ref Range: 4.0 - 1 Febuxostat 80 MG Oral Tab, Take 1 tablet by mouth daily. , Disp: 90 tablet, Rfl: 0  •  fluconazole 50 MG Oral Tab, Take 1 tab PO daily Dose adjusted CRI.  Hold simvastatin while taking this., Disp: 7 tablet, Rfl: 1  •  clotrimazole-betamethasone 1-0.05 % Ext Subcutaneous Solution Pen-injector, Inject 15 units into the skin daily and once steroids completed return to 10 units daily. , Disp: 5 pen, Rfl: 1  •  insulin aspart (NOVOLOG FLEXPEN) 100 UNIT/ML Subcutaneous Solution Pen-injector, Inject 1-68 Units into t Dilated cardiomyopathy - last EF 30-35% on echo; historically on BB/AA. No ACEi/ARB/ARNI due to hypotension/CKD  4. COPD - on nocturnal O2  5. JULIÁN - CPAP intolerance.   6. CKD stage 3 - baseline creatinine is approximately1.5-1.7 mg/dl, tolerating increased

## 2017-08-15 NOTE — PROGRESS NOTES
Education Record  Learner:  Patient  Disease / Diagnosis:   Lung cancer  Barriers / Limitations:  None  Method:  Printed material and Reinforcement  General Topics:  Plan of care reviewed; keytruda side effects/schedule;    Outcome:  Shows understanding and

## 2017-08-15 NOTE — PROGRESS NOTES
[facility] Progress Note    Patient Name: Alber Gamez   YOB: 1940   Medical Record Number: [id number]   CSN: [id number]   Attending Physician: Marleny Billingsley M.D.    Referring Physician: Lula Emanuel MD      Date of Visit: 8/15/2017 (two) times daily. , Disp: 45 g, Rfl: 1  •  maalox/diphenhydramine/sucralfate Oral Suspension, Take 10 mL by mouth 4 (four) times daily before meals and nightly., Disp: 480 mL, Rfl: 0  •  Prochlorperazine Maleate (COMPAZINE) 10 mg tablet, Take 1 tablet (10 unit of insulin for every 30 points blood glucose is greater than 140 mg/dL Inject insulin into the skin prior to meals  Inject 1 unit of insulin for every 7 grams of carbohydrates eaten Inject within 10 minutes before or after a meal, Disp: 5 pen, Rfl: 3 Personal history of antineoplastic chemotherapy    • Personal history of arthritis    • Pneumonia, organism unspecified(486)    • RENAL DISEASE    • Renal disorder     ckd stage 3   • Renal failure, unspecified    • SLEEP APNEA    • Type II or unspecified °C) (Tympanic)   Resp 20   Wt 118 kg (260 lb 3.2 oz)   SpO2 97%   BMI 36.31 kg/m²       Physical Examination:  General: Patient is alert and oriented x 3, not in acute distress. Appears tired. Psych:  Mood and affect appropriate  HEENT: EOMs intact.  PERRL comorbidities to start immunotherapy. Emotional Well Being:  I have assessed the patient's emotional well-being and any concerns about anxiety or depression.   We discussed issues of distress, coping difficulties and social support systems and current

## 2017-08-15 NOTE — PROGRESS NOTES
Pt here for 1 week MD f/u and due for Keytruda. Energy level is a little improved. Appetite has been fair, has to force himself to eat. Pt notes losing fluid weight, breathing is improved.  Pt notes continued pain in shoulder, slightly improved with cortiso

## 2017-08-16 NOTE — TELEPHONE ENCOUNTER
Date of Treatment: 8/15/17                                Type of Chemo: Pembrolizumab    Comments: Spoke with patient and wife. Pt is feeling good overall after treatment however, shoulder is still bothering him.  He did call ortho office this am- instruct

## 2017-08-16 NOTE — PROGRESS NOTES
Labs reviewed. No changes. Results for Alfonzo Lebron (MRN [id number]) as of 8/16/2017 10:38   Ref.  Range 8/15/2017 13:33   Glucose Latest Ref Range: 70 - 99 mg/dL 199 (H)   Sodium Latest Ref Range: 136 - 144 mmol/L 137   Potassium Latest Ref Range: 3 Neutrophils % Latest Units: % 76.8   Lymphocytes % Latest Units: % 8.3   Monocytes % Latest Units: % 13.1   Eosinophils % Latest Units: % 0.8   Basophils % Latest Units: % 0.3   Immature Granulocyte % Latest Units: % 0.7     AARON Vazquez  8/16/201

## 2017-08-17 NOTE — PROGRESS NOTES
[facility] Progress Note    Patient Name: Bea Rosa   YOB: 1940   Medical Record Number: [id number]   CSN: [id number]   Attending Physician: Kj Goetz M.D.    Referring Physician: Babak Cooney MD      Date of Visit: 8/17/2017 (ZOFRAN) 8 MG tablet, Take 1 tablet (8 mg total) by mouth every 8 (eight) hours as needed for Nausea., Disp: 30 tablet, Rfl: 3  •  hydrocodone-acetaminophen (NORCO) 7.5-325 MG Oral Tab, Take 1 tablet by mouth every 6 (six) hours as needed for Pain., Disp: (NEPHRO-ARABELLA RX) 1 MG Oral Tab, Take 1 tablet by mouth daily with breakfast., Disp: , Rfl:   •  ferrous sulfate 325 (65 FE) MG Oral Tab EC, Take 325 mg by mouth every evening.  , Disp: , Rfl:   •  Budesonide-Formoterol Fumarate 160-4.5 MCG/ACT Inhalation A chemotherapy    • Personal history of arthritis    • Pneumonia, organism unspecified(486)    • RENAL DISEASE    • Renal disorder     ckd stage 3   • Renal failure, unspecified    • SLEEP APNEA    • Type II or unspecified type diabetes mellitus without ment 1.803 m (5' 10.98\")   Wt 121.1 kg (267 lb)   SpO2 90%   BMI 37.26 kg/m²       Physical Examination:  General: Patient is alert and oriented x 3, not in acute distress. Appears tired. Psych:  Mood and affect appropriate  HEENT: EOMs intact. PERRERIC.  Tab [place] (H)   RDW-SD Latest Ref Range: 35.1 - 46.3 fL 63.0 (H)   Prelim Neutrophil Abs Latest Ref Range: 1.30 - 6.70 x10 (3) uL 4.42   Neutrophils Absolute Latest Ref Range: 1.30 - 6.70 x10(3) uL 4.42   Lymphocytes Absolute Latest Ref Range: 0.90 - 4.00 x10(3) uL is feeling better. Not as discouraged.      Marleny Billingsley MD

## 2017-08-18 PROBLEM — S46.111D RUPTURE LONG HEAD BICEPS TENDON, RIGHT, SUBSEQUENT ENCOUNTER: Status: ACTIVE | Noted: 2017-01-01

## 2017-08-21 NOTE — PROGRESS NOTES
Nutrition screen complete as triggered by Best Practice dx of recurrent malignant neoplasm of overlapping sites of left lung. Chart reviewed. Noted wt fluctuations appear fluid related to CHF. Noted pt reporting good appetite. Pt on immunotherapy.  Pt appea

## 2017-08-30 NOTE — PROGRESS NOTES
ANP Visit Note    Patient Name: Kevin Kilgore   YOB: 1940   Medical Record Number: [id number]   CSN: [id number]   Date of visit: 7/18/2017       Chief Complaint/Reason for Visit:  Fatigue/ shortness of breath      History of Present Illness: COPD    • COPD (chronic obstructive pulmonary disease) (HCC)    • COPD bronchitis    • DIABETES    • Esophageal reflux    • Essential hypertension    • Exposure to radiation    • Fitting and adjustment of cardiac pacemaker    • Ganglion, unspecified     GL Topics   Smoking status: Former Smoker  1.50 Packs/day  For 45.00 Years     Quit date: 4/1/2006    Smokeless tobacco: Never Used    Alcohol use No    Comment: MAYBE ONCE A YEAR    Drug use: No    Sexual activity: Not on file     Other Topics Concern    Caf EC, Take 1 tablet (40 mg total) by mouth every morning before breakfast., Disp: 90 tablet, Rfl: 2  •  simvastatin (ZOCOR) 40 MG Oral Tab, Take 1 tablet (40 mg total) by mouth nightly., Disp: 90 tablet, Rfl: 2  •  BuPROPion HCl ER, XL, 150 MG Oral Tablet 24 into the lungs daily.  handhaler , Disp: , Rfl:   •  Albuterol Sulfate HFA (PROAIR HFA) 108 (90 BASE) MCG/ACT Inhalation Aero Soln, Inhale 2 puffs into the lungs every 4 (four) hours as needed for Wheezing., Disp: 1 Inhaler, Rfl: 6    Review of Systems:  A Date: 07/17/2017  Value: 24          Ref range: 15 - 41 U/L        Status: Final  Alt                                           Date: 07/17/2017  Value: 34          Ref range: 17 - 63 U/L        Status: Final  Bilirubin, Tota Date: 07/17/2017  Value: 33.7*       Ref range: 27.0 - 33.2 pg     Status: Final  [facility]                                          Date: 07/17/2017  Value: 33.7        Ref range: 31.0 - 37.0 g/dL   Status: Final  RDW Ref range: %                  Status: Final  Immature Granulocyte %                        Date: 07/17/2017  Value: 0.6         Ref range: %                  Status: Final  Slide Review                                  Date: 07/17/2017  Value: Slid

## 2017-09-05 NOTE — PROGRESS NOTES
Education Record  Learner:  Patient and spouse  Disease / Diagnosis:   Lung cancer  Barriers / Limitations:  None  Method:  Printed material and Reinforcement  General Topics:  Plan of care reviewed; reviewed mAb and mechanism of action  Outcome:  Shows un

## 2017-09-05 NOTE — PROGRESS NOTES
Pt here for 3 week MD f/u and due for Keytruda. Pt notes feeling well for 2 weeks after, but then hasn't felt good the past week.  The past week pt has been very fatigued, more SOB, appetite low, has to force himself to eat, has a hard time walking, has chr

## 2017-09-12 NOTE — PROGRESS NOTES
[facility] Cardiac Health Progress Note    Cely Mcmahan is a 65year old male who presents to clinic for APN assessment and management of chronic systolic and diastolic heart failure and is functional class 3.      Subjective:  He retur mouth daily. , Disp: , Rfl:   •  DPH-Lido-AlHydr-MgHydr-Simeth (FIRST-MOUTHWASH BLM) Mouth/Throat Suspension, TAKE 10ML BY MOUTH 4 TIMES A DAY BEFORE MEALS AND AT BEDTIME AS DIRECTED, Disp: , Rfl: 0  •  gabapentin 300 MG Oral Cap, Take 2 capsules (600 mg to (six) hours as needed for Wheezing., Disp: 1 Box, Rfl: 3  •  Metoprolol Succinate ER 25 MG Oral Tablet 24 Hr, Take 0.5 tablets (12.5 mg total) by mouth every evening., Disp: 45 tablet, Rfl: 3  •  SPIRIVA RESPIMAT 2.5 MCG/ACT Inhalation Aero Soln, , Disp: , warm, dry    Education:  Patient instructed regarding sodium restricted diet, low sodium foods, fluid restriction, daily weights, medication regimen, s/s HF exacerbation and when to call APN/clinic. Assessment:   1.  Chronic systolic and diastolic H

## 2017-09-15 NOTE — TELEPHONE ENCOUNTER
Spoke with a patient and he states he took Tramadol 50mg three times yesterday as directed and woke up with symptoms today: blurred, double vision and kidney pain. He could not handle the Norco 7.5/325mg because it made him feel \"loopy\" and drowsy.      D

## 2017-09-15 NOTE — TELEPHONE ENCOUNTER
Pt has a torn rotator cuff and they gave him Tramadol 50mg, woke up this morning and is seeing double and kidney pain, is this from the medication?

## 2017-09-16 PROBLEM — I50.9 ACUTE ON CHRONIC CONGESTIVE HEART FAILURE (HCC): Status: ACTIVE | Noted: 2017-01-01

## 2017-09-16 PROBLEM — R77.8 ELEVATED TROPONIN: Status: ACTIVE | Noted: 2017-01-01

## 2017-09-16 PROBLEM — I50.9 ACUTE ON CHRONIC CONGESTIVE HEART FAILURE, UNSPECIFIED CONGESTIVE HEART FAILURE TYPE: Status: ACTIVE | Noted: 2017-01-01

## 2017-09-16 NOTE — CONSULTS
[facility]  Pulmonary/Critical Care Consult Note  [facility]  Report of Consultation    Kumar May Patient Status:  Inpatient    1940 MRN [id number]   [facility] 8NE-A Attending Nelson Landeros Fitting and adjustment of cardiac pacemaker    • Ganglion, unspecified     GLOBAL EXP / 07/07/2014 / RIGHT WRIST / Armida Preciadoo 3/31/2014   • High blood pressure    • High cholesterol    • History of blood transfusion    • Hyperlipidemia    • Lipid screening 4/2 tablet Oral Daily   • ferrous sulfate  325 mg Oral QPM   • gabapentin  600 mg Oral BID   • insulin detemir  10 Units Subcutaneous Daily   • maalox/diphenhydramine/sucralfate  10 mL Oral TID AC and HS   • Metoprolol Succinate ER  12.5 mg Oral QPM   • [START SI/HI    All other review of systems are negative.     Vital signs in last 24 hours:   09/16/17  1128 09/16/17  1157 09/16/17  1303 09/16/17  1417   BP: 126/61 104/55 122/62 112/73   BP Location:    Left arm   Pulse: 89 88 83 96   Resp: 20 18 19 20   Temp: ABGHCO3, ABGBE, TEMP, BERT, SITE, DEV, THGB in the last 72 hours. Invalid input(s): AQO13CVW, CHOB    Cultures:     No results found for this visit on 09/16/17.   No results for input(s): Jackson Lin, [address], [address], LOU, GABBY, [address] [facility], P

## 2017-09-16 NOTE — ED PROVIDER NOTES
Patient Seen in: [facility] Emergency Department    History   Patient presents with:  Dyspnea CELIA SOB (respiratory)    Stated Complaint: CELIA    HPI    59-year-old white male who presents emerged from today for complaint of difficulty breathing.   See Queen failure, unspecified    • SLEEP APNEA    • Type II or unspecified type diabetes mellitus without mention of complication, not stated as uncontrolled    • Unspecified sleep apnea    • Visual impairment        Past Surgical History:  No date: ANGIOGRAM  No d supple. Lungs have a few wheezes and crackles to auscultation bilaterally.   Heart is regular rate and rhythm without murmur gallop or rub    Abdomen is soft nondistended nontender to deep palpation there is no rebound or guarding noted no hepatosplenome ---------                               -----------         ------                     CBC W/ DIFFERENTIAL[[id number]]          Abnormal            Final result                 Please view results for these tests on the individual orders.    VIRGIL myocardial infarction. Patient otherwise looks good. I spoke to the [facility] hospitalist who will admit the patient primarily with cardiology and pulmonology as consultants.         Disposition and Plan     Clinical Impression:  Acute on chronic congestive

## 2017-09-16 NOTE — H&P
[facility]  History and Physical     [place] Patient Status:  Emergency    1940 MRN [id number]   Location [facility] Attending Thea Berkowitz MD   Hosp Day # 0 PCP Haim Lee MD     Chief Complain without mention of complication, not stated as uncontrolled    • Unspecified sleep apnea    • Visual impairment         Past Surgical History: Past Surgical History:  No date: ANGIOGRAM  No date: APPENDECTOMY  No date: APPENDECTOMY  No date: CHOLECYSTECTOM Tab Take 1 tablet by mouth daily. Disp: 90 tablet Rfl: 0   fluconazole 50 MG Oral Tab Take 1 tab PO daily Dose adjusted CRI. Hold simvastatin while taking this.  Disp: 7 tablet Rfl: 1   clotrimazole-betamethasone 1-0.05 % External Cream Apply 1 Application to 10 units daily. Disp: 5 pen Rfl: 1   insulin aspart (NOVOLOG FLEXPEN) 100 UNIT/ML Subcutaneous Solution Pen-injector Inject 1-68 Units into the skin 3 (three) times daily before meals.  Inject 1 unit of insulin for every 30 points blood glucose is greate neurological deficits. CNII-XII grossly intact. Musculoskeletal: Moves all extremities. Extremities: No edema or cyanosis. Integument: No rashes or lesions. Psychiatric: Appropriate mood and affect.       Diagnostic Data:      Labs:  Recent Labs   Lab

## 2017-09-16 NOTE — CONSULTS
[facility]  Report of Consultation    Alber Gamez Patient Status:  Emergency    1940 MRN [id number]   Location [facility] [facility] Attending Silvia Shay MD   Hosp Day # 0 PCP Lula Emanuel chemotherapy    • Personal history of arthritis    • Pneumonia, organism unspecified(486)    • RENAL DISEASE    • Renal disorder     ckd stage 3   • Renal failure, unspecified    • SLEEP APNEA    • Type II or unspecified type diabetes mellitus without ment 2+ no bruits. Cardiac: Regular rate and rhythm, S1, S2 normal, no murmur, rub or gallop. Lungs: Clear without wheezes, rales, rhonchi or dullness. Normal excursions and effort. Abdomen: Soft, non-tender.    Extremities: Without clubbing, cyanosis or lorena

## 2017-09-16 NOTE — ED INITIAL ASSESSMENT (HPI)
Presents with CELIA. Hx of lung CA, COPD. [place] some SOB before bed wore 2.5l O2 overnight, this am did not feel any better. Pt should wear home O2 all the time but does not.

## 2017-09-16 NOTE — ED NOTES
Pt repositioned for comfort - call light w/in reach. Pt states he is feeling for much better. Family at bedside.

## 2017-09-17 NOTE — PROGRESS NOTES
[facility]  Progress Note     Lum Cordial Patient Status:  Inpatient    1940 MRN [id number]   [facility] 8NE-A Attending Lele Still MD   Hosp Day # 1 PCP Bashir Jefferson MD     Chief Complaint: SOB    S: Patient had some ferrous sulfate  325 mg Oral QPM   • gabapentin  600 mg Oral BID   • insulin detemir  10 Units Subcutaneous Daily   • maalox/diphenhydramine/sucralfate  10 mL Oral TID AC and HS   • Metoprolol Succinate ER  12.5 mg Oral QPM   • Pantoprazole Sodium  40 mg O

## 2017-09-17 NOTE — PLAN OF CARE
CARDIOVASCULAR - ADULT    • Maintains optimal cardiac output and hemodynamic stability Progressing    • Absence of cardiac arrhythmias or at baseline Progressing    Pt on tele, V-paced on monitor. IV Lasix BID. +1 edema to BLE.      RESPIRATORY - ADULT    •

## 2017-09-17 NOTE — PLAN OF CARE
Pt received from ER a/ox4. Pt reported double and blurry vision at times for the past 24 hours. Dr. Zapien Sportsman notified. No new orders. 2L NC Sp02 98%. Bilateral pleural effusions. Right greater than left. IV Solumedrol and IV Lasix.  Lungs diminished to bilate

## 2017-09-17 NOTE — PROGRESS NOTES
[facility]  Progress Note    Brittany Bailey Patient Status:  Inpatient    1940 MRN [id number]   [facility] 8NE-A Attending Jamila Fiore MD   Hosp Day # 1 PCP Chan Weiss MD       Assessment and Plan:  Patient Active Problem (109.7 kg)   SpO2 100%   BMI 31.91 kg/m²     Temp (24hrs), Av.8 °F (36.6 °C), Min:97.6 °F (36.4 °C), Max:98.1 °F (36.7 °C)      Intake/Output:    Intake/Output Summary (Last 24 hours) at 17 1232  Last data filed at 17 0430   Gross per 24 ho [facility] SR) 12 hr tab 150 mg 150 mg Oral Daily   febuxostat (ULORIC) tab 80 mg 80 mg Oral Daily   ferrous sulfate EC tab 325 mg 325 mg Oral QPM   gabapentin (NEURONTIN) cap 600 mg 600 mg Oral BID   HYDROcodone-acetaminophen (NORCO)  MG per tab 1

## 2017-09-17 NOTE — PROGRESS NOTES
[facility] Pulmonary/Critical Care Progress Note     SUBJECTIVE/24H Events: All events, procedures, notes reviewed. Patient reports feeling well this morning, although didn't sleep well.  Denies concerns, dyspnea improve acetaminophen (TYLENOL) tab 650 mg 650 mg Oral Q6H PRN   ondansetron HCl (ZOFRAN) injection 4 mg 4 mg Intravenous Q6H PRN   Insulin Aspart Pen (NOVOLOG) 100 UNIT/ML flexpen 1-5 Units 1-5 Units Subcutaneous TID CC and HS   [COMPLETED] MethylPREDNISolone S ulcers, nodules    Lab Data Review:   Recent Labs   Lab  09/16/17   0901  09/17/17   0506   GLU  171*  223*   BUN  40*  48*   CREATSERUM  1.98*  1.74*   CA  9.1  8.5   NA  130*  130*   K  4.4  4.7   CL  93*  92*   CO2  28.0  26.0     Recent Labs   Lab  09/ 10: 34     Approved by: Mike Machuca MD              • [START ON 9/18/2017] insulin detemir  12 Units Subcutaneous Daily   • multivitamin  1 tablet Oral Daily with breakfast   • Fluticasone Furoate-Vilanterol  1 puff Inhalation Daily   • BuPROPion HCl ER (

## 2017-09-18 NOTE — PROGRESS NOTES
[facility]  Progress Note    Zenia Gold Patient Status:  Inpatient    1940 MRN [id number]   [facility] 8NE-A Attending Farhad Min MD   Hosp Day # 2 PCP Yaz Ferreira MD     ASSESSMENT  · Elevated troponin in setting of CP or SOB. Objective:  Oxygen Therapy  SpO2: 95 %  O2 Device: None (Room air)  O2 Flow Rate (L/min): 2 L/min  Pulse Oximetry Type: Continuous  Blood pressure 122/60, pulse 85, temperature 97.9 °F (36.6 °C), temperature source Oral, resp.  rate 18, height Facility-Administered Medications:  heparin (PORCINE) drip 76125eqgpv/250mL infusion CONTINUOUS 200-3,000 Units/hr Intravenous Continuous   insulin detemir (LEVEMIR) 100 UNIT/ML flextouch 12 Units 12 Units Subcutaneous Daily   ALPRAZolam (XANAX) tab 0.5 mg PEAK flow  PF Readings from Last 1 Encounters:  07/25/17 : 95 L/min        Ni Frost  9/18/2017  3:20 PM

## 2017-09-18 NOTE — PLAN OF CARE
Pt only had 100ml of urine output since given Lasix this morning. He did void X1 prior to dose given. APN notified for cardiology.

## 2017-09-18 NOTE — CM/SW NOTE
09/18/17 1300   CM/SW Referral Data   Referral Source Physician   Reason for Referral Discharge planning;Protocol order set   Specify order set COPD   Informant Patient; Children   Pertinent Medical Hx   Primary Care Physician Name Santana Cabot   Patient Info

## 2017-09-18 NOTE — PROGRESS NOTES
[facility]  Progress Note     Veronique Freeman Patient Status:  Inpatient    1940 MRN [id number]   [facility] 8NE-A Attending Ketan Stevenson MD   Hosp Day # 2 PCP Linda Carr MD     Chief Complaint: SOB    S: Patient without Daily   • ferrous sulfate  325 mg Oral QPM   • gabapentin  600 mg Oral BID   • maalox/diphenhydramine/sucralfate  10 mL Oral TID AC and HS   • Metoprolol Succinate ER  12.5 mg Oral QPM   • Pantoprazole Sodium  40 mg Oral QAM AC   • doxercalciferol  0.5 mcg

## 2017-09-18 NOTE — PROGRESS NOTES
[facility]  Cardiology Progress Note    Alber Gamez Patient Status:  Inpatient    1940 MRN [id number]   [facility] 8NE-A Attending Aren Humphreys MD   Hosp Day # 2 PCP Lula Emanuel MD     Subjective:  C/o weakness this mor normal,. Lungs: Diminisihed bilateral bases  Abdomen: Soft, non-tender. Extremities: BLE edema  Skin: Warm and dry.      Medications:  • insulin detemir  12 Units Subcutaneous Daily   • multivitamin  1 tablet Oral Daily with breakfast   • Fluticasone Fur paced. 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    On antibiotic for productive cough prior to admission. 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 Lost 16 lbs with poor oral intake the last 4-5 months.     CXR - tiny to small pleural

## 2017-09-18 NOTE — DIETARY MALNUTRITION NOTE
NUTRITION FOLLOW UP ASSESSMENT    Pt is at moderate nutrition risk. Pt meets severe malnutrition criteria.     NUTRITION DIAGNOSIS/PROBLEM:    Malnutrition in setting of chronic illness related to decreased ability to consume sufficient energy as evidenced kg IBW)  Protein: 110-126 grams protein/day (1.3-1.5 grams protein per kg IBW)  Fluid: ~1 ml/kcal or per MD discretion      MONITOR AND EVALUATE/NUTRITION GOALS:   1. PO intake to meet at least 75% patient nutrition prescription  2.  At least 75% intake of

## 2017-09-19 NOTE — HOME CARE LIAISON
MET WITH PTNT TO DISCUSS HOME HEALTH SERVICES AND COVERAGE CRITERIA. PTNT AGREEABLE TO Darrick Rider. PTNT GIVEN RESIDENTIAL BROCHURE. RESIDENTIAL WITH PROVIDE SN/PT ON DISCHARGE.     Thank you for this referral,   Buddy Vale

## 2017-09-19 NOTE — CM/SW NOTE
Sw met with pt and wife this am to further discuss dc planning. Sw discussed HHC again and wife agrees that this would be a good idea. Pt is also in agreement.   Pt offered choice of HHC and would like to user [facility] since he has used them in the

## 2017-09-19 NOTE — PROGRESS NOTES
[facility]  Cardiology Progress Note    Trixie Chino Patient Status:  Inpatient    1940 MRN [id number]   [facility] 8NE-A Attending Meri Murillo MD   Hosp Day # 3 PCP China Johansen MD     Subjective:  States that lasix beckwith maalox/diphenhydramine/sucralfate  10 mL Oral TID AC and HS   • Metoprolol Succinate ER  12.5 mg Oral QPM   • Pantoprazole Sodium  40 mg Oral QAM AC   • doxercalciferol  0.5 mcg Oral Daily   • atorvastatin  20 mg Oral Nightly   • Umeclidinium Bromide  1 pu

## 2017-09-19 NOTE — PROGRESS NOTES
[facility]  Progress Note     Alber Gamez Patient Status:  Inpatient    1940 MRN [id number]   [facility] 8NE-A Attending Yesenia Rodriguez MD   Hosp Day # 3 PCP Lula Emanuel MD     Chief Complaint: SOB    S: Patient without mg Oral QPM   • Pantoprazole Sodium  40 mg Oral QAM AC   • doxercalciferol  0.5 mcg Oral Daily   • atorvastatin  20 mg Oral Nightly   • Umeclidinium Bromide  1 puff Inhalation Daily   • Insulin Aspart Pen  1-5 Units Subcutaneous TID CC and HS   • aspirin

## 2017-09-19 NOTE — PLAN OF CARE
Dr. Brooklynn Arzola notified of pt's c/o double vision. CT of the head ordered and patient informed of this. Pt. States that this has been going on for four days.

## 2017-09-19 NOTE — PROGRESS NOTES
[facility]    Progress Note    Tripp Laird Patient Status:  Inpatient    1940 MRN [id number]   [facility] 8NE-A Attending Karen Kaufman MD   [facility] Day # 3 PCP Landon Junior MD     Subjective:  Tripp Laird is a(n) 65 regurgitation. 4. Mitral valve: Mildly calcified annulus. 5. Left atrium: The left atrium was mildly enlarged. 6. Right atrium: The atrium was mildly dilated. 7. Impressions:  In a side by side comparison of the previous study from     10/13/206, there velocity was within the normal range. There was mild  regurgitation. Pericardium: Ria Brochure was no pericardial effusion. Aorta:  Aortic root was 4.2cm at the sinus of Valsalva.     Medications Reviewed:    Current Facility-Administered Medications:  heparin (NOVOLOG) 100 UNIT/ML flexpen 1-5 Units 1-5 Units Subcutaneous TID CC and HS   aspirin EC tab 81 mg 81 mg Oral Once per day on Mon Wed Fri   furosemide (LASIX) injection 40 mg 40 mg Intravenous BID (Diuretic)       Assessment and Plan:  Patient Active Prob Dawson  9/19/2017  8:31 AM

## 2017-09-19 NOTE — PLAN OF CARE
Pt. Is oriented times three. He has no c/o pain at present. Lungs diminished on auscultation. He is V paced on monitor. Lower extremities with three plus edema noted. Heparin infusing at 1200 units/hr.  Pt. States that he has a torn rotator cuff right arm a

## 2017-09-19 NOTE — PLAN OF CARE
Pt able to void some more after administration of second dose of Lasix. Dr. Issac Camacho called back regarding orthopedic consultation and advised that pt should follow up as outpatient with Dr. Jaquelin May, who he has been seeing for his rotator cuff tear.  Pt update

## 2017-09-19 NOTE — TELEPHONE ENCOUNTER
Pt admitted on Saturday with c/o SOB. Pt now has c/o double vision and all over sore muscles. Wondering if this is r/t Keytruda 2 weeks ago. Pt still admitted in hospital. Pt is having CT of brain and chest today.  Dr. Rudy Sawyer will see pt in hospital tomorr

## 2017-09-20 NOTE — PROGRESS NOTES
[facility]    Progress Note    Marcos Ulrich Patient Status:  Inpatient    1940 MRN [id number]   [facility] 8NE-A Attending Agustín Rene MD   [facility] Day # 4 PCP Panda Almanzar MD     Subjective:  Pujamarina Mojgan is a(n) 65 throughout both lungs. Stable scattered areas of mucus plugging most pronounced in the lower lobes. There is increased atelectasis/consolidation in the right lower lobe with   developing pneumonia not excluded. Clinical correlation recommended.  Other stabl showed     dyssynergy. 3. Aortic valve: There was mild stenosis. Mild regurgitation. 4. Mitral valve: Mildly calcified annulus. 5. Left atrium: The left atrium was mildly enlarged. 6. Right atrium: The atrium was mildly dilated. 7. Impressions:  In a s normal valve.   Cusp separation was normal.  Doppler:  Transvalvular velocity was within the normal range. There was mild  regurgitation. Pericardium: Larwance Golds was no pericardial effusion. Aorta:  Aortic root was 4.2cm at the sinus of Valsalva.     Daryle Mort 8 tablet Oral Q15 Min PRN   acetaminophen (TYLENOL) tab 650 mg 650 mg Oral Q6H PRN   ondansetron HCl (ZOFRAN) injection 4 mg 4 mg Intravenous Q6H PRN   Insulin Aspart Pen (NOVOLOG) 100 UNIT/ML flexpen 1-5 Units 1-5 Units Subcutaneous TID CC and HS   aspiri week  · Continue BDs, inhalers, IS, ambulate as tolerates  · Possible PSG outpt with re-try of Cpap outpt  · Discussed need for compliance with home O2 2.5 liters 24/7. States he will wear as ordered.    · To f/u with [facility] upon discharge once stronger with

## 2017-09-20 NOTE — PLAN OF CARE
RESPIRATORY - ADULT    • Achieves optimal ventilation and oxygenation Adequate for Discharge          CARDIOVASCULAR - ADULT    • Maintains optimal cardiac output and hemodynamic stability Progressing    • Absence of cardiac arrhythmias or at baseline Prog

## 2017-09-20 NOTE — PROGRESS NOTES
Patient's wife stated their portable Oxygen tank is not working. She needs O2 tank to transport patient home. Patient has [address] [facility]. RN called our RT team,but unfortunately , hospital doesn't carry [facility] O2 tank.  This RN called Chelsey Barnard after vinicio

## 2017-09-20 NOTE — PROGRESS NOTES
[facility]  Cardiology Progress Note    Colette Kay Patient Status:  Inpatient    1940 MRN [id number]   [facility] 8NE-A Attending Williams Abdalla MD   [facility] Day # 4 PCP Asif Orantes MD     Subjective:  Sitting up in the chair. Oral Daily with breakfast   • Fluticasone Furoate-Vilanterol  1 puff Inhalation Daily   • BuPROPion HCl ER (SR)  150 mg Oral Daily   • febuxostat  80 mg Oral Daily   • ferrous sulfate  325 mg Oral QPM   • gabapentin  600 mg Oral BID   • maalox/diphenhydram

## 2017-09-20 NOTE — PROGRESS NOTES
[facility]  Progress Note     Ricka Quivers Patient Status:  Inpatient    1940 MRN [id number]   [facility] 8NE-A Attending Chrystal Oppenheim, MD   Hosp Day # 4 PCP Cesia Ashraf MD     Chief Complaint: SOB    S: Patient without • gabapentin  600 mg Oral BID   • maalox/diphenhydramine/sucralfate  10 mL Oral TID AC and HS   • Metoprolol Succinate ER  12.5 mg Oral QPM   • Pantoprazole Sodium  40 mg Oral QAM AC   • doxercalciferol  0.5 mcg Oral Daily   • atorvastatin  20 mg Oral Ni

## 2017-09-20 NOTE — CM/SW NOTE
Received call from [facility], Amilcar Lopez. Patient's home portable O2 tank is malfunctioning.    Chris Deutsch was called by Amilcar Lopez to get replacement tank because we have no more in the hospital.  I also called Gabriella to give them information and that we need tank STAT fo

## 2017-09-20 NOTE — CONSULTS
Hematology-Oncology Consultation Note    Patient Name: Zenia Gold   YOB: 1940   Medical Record Number: [id number]   CSN: [id number]   Consulting Physician: Danette Roper MD  Date of Consultation: 9/20/2017     Reason for Consultation: site    • OTHER DISEASES     periph neuropathy legs   • Pain in joint, lower leg    • Personal history of antineoplastic chemotherapy    • Personal history of arthritis    • Pneumonia, organism unspecified(486)    • RENAL DISEASE    • Renal disorder     ck pertinent positives and negative per the HPI    Vital Signs:  /59 (BP Location: Left arm)   Pulse 82   Temp (!) 97.4 °F (36.3 °C) (Oral)   Resp 20   Ht 1.854 m (6' 1\")   Wt 111.8 kg (246 lb 7.6 oz)   SpO2 99%   BMI 32.52 kg/m²     Physical Examinati 03/05/2014 1215    MCH 33.2 09/17/2017 0506    MCH 33.6 (H) 09/16/2017 0901    MCH 33.5 (H) 09/12/2017 0831    MCH 37.7 (H) 09/20/2016 1005    MCH 31.2 08/17/2010 1044    MCH 33.1 05/27/2010 1622    MCH 32.8 05/01/2008 1229    Mean Corpuscular Hemoglobin 3 04/30/2014 1146    .0 03/26/2014 1234    .0 03/05/2014 1215       Lab Component   Component Value Date/Time    GLUCOSE 149 (H) 07/14/2014 0910    GLUCOSE 204 (H) 06/02/2014 1105    GLUCOSE 168 (H) 05/12/2014 0830    GLUCOSE 144 (H) 04/27/2011 4.1 09/23/2008 1335    ALBUMIN 3.4 (L) 03/26/2014 1234    ALBUMIN 3.1 (L) 02/19/2014 1423    ALBUMIN 3.1 (L) 01/22/2014 0948    ALBUMIN 4.0 04/27/2011 0931    Sodium 126 (L) 09/20/2017 0520    Sodium 125 (L) 09/19/2017 0956    Sodium 130 (L) 09/17/2017 050 PHOSPHATASE 58 04/27/2011 0931    AST (SGOT) 16 07/26/2010 1540    AST (SGOT) 18 08/07/2009 0949    AST (SGOT) 21 09/23/2008 1335     (H) 09/16/2017 0901    AST 21 09/05/2017 1059    AST 21 08/15/2017 1333    AST 13 (L) 03/26/2014 1234    AST 14 (L) deep periventricular white matter are likely sequelae of chronic small vessel ischemic disease. 8.9 x 2.1 cm arachnoid cyst adjacent to the right frontal lobe is stable. Ventricles and sulci are otherwise appropriate for the patient's age.   No mass effect architectural distortion are seen scattered in both lungs. VASCULATURE:  Enlargement of the pulmonary arterial trunk up to 4 cm suggesting underlying pulmonary arterial hypertension. Clinical correlation recommended.   THORACIC AORTA:  Mild calcified plaqu onc standpoint when cleared by everyone else. He has an appointment to see me on Tuesday. I told him given his acute issues may be prudent to hold off on further immunotherapy for now.     Brock Looney MD

## 2017-09-20 NOTE — DISCHARGE SUMMARY
[facility] HOSPITALIST  DISCHARGE SUMMARY     Tripp Laird Patient Status:  Inpatient    1940 MRN [id number]   [facility] 8NE-A Attending Sang Martins MD   [facility] Day # 4 PCP Sean Bustamante MD     Date of Admission: 2017  Date of Dis blurry vision followed with outpatient ophthalmology he has already been set up with.     Procedures during hospitalization:   Xr Chest Pa + Lat Chest (pqo=[id number])    Result Date: 9/16/2017  CONCLUSION:  #1. Small bilateral pleural effusions the right greate patient's age. No mass effect. Visualized portions of paranasal sinuses are unremarkable. Visualized portions of the mastoid air cells are unremarkable. Visualized portions of the orbits are unremarkable.  IMPRESSION: Stable large arachnoid cyst adjacent to previous dated 10/13/2016: There  is a slight decrease in LVEF, from 30-35% to 25-30%, regional variations and  septal dyssynergy remain unchanged.     Incidental or significant findings and recommendations (brief descriptions):  • See above    Lab/Test res 1 tablet by mouth every 6 (six) hours as needed for Pain. Quantity:  90 tablet  Refills:  0     Insulin Aspart Pen 100 UNIT/ML Sopn  Commonly known as:  NOVOLOG FLEXPEN      Inject 1-68 Units into the skin 3 (three) times daily before meals.  Inject 1 uni spironolactone 25 MG Tabs  Commonly known as:  ALDACTONE      Take 1 tablet (25 mg total) by mouth daily. Refills:  0     Tiotropium Bromide Monohydrate 18 MCG Caps  Commonly known as:  SPIRIVA HANDIHALER      Inhale 18 mcg into the lungs daily.  handha

## 2017-09-21 NOTE — TELEPHONE ENCOUNTER
Patient was discharged home 9/20/17 from [facility]  Per Alexandra Gatica her  appears to be weaker. He is not able to get up and walk with the walker.  Last night Valentín Frank was sitting on the edge of the bed and he slid down to the floor, because his legs co

## 2017-09-21 NOTE — TELEPHONE ENCOUNTER
Dr. Marquise Espinal agrees with discharge instructions, patient to be evaluated by [facility] PT for gait training and strengthening. Also  to assess case for other recommendations as far as home care.      Spoke with Sheri Watson and she states [facility] will be co

## 2017-09-21 NOTE — PROGRESS NOTES
.     Initial Post Discharge Follow Up   Discharge Date: 9/20/17  Contact Date: 9/21/2017    Consent Verification:  Assessment Completed With: Spouse: Awais Begum received per patient?  written  HIPAA Verified?   Yes    Discharge Dx:   CHF, Acute on Ch to support him. • Were you given a specific diet to follow at discharge?   yes  o Which diet? Low sodium  - Do you need more information on this diet? no  - Are you able to follow this diet?    yes    Medications:     Current Outpatient Prescriptions:  AL capsule (1 mcg total) by mouth daily. Disp: 90 capsule Rfl: 3   albuterol sulfate (2.5 MG/3ML) 0.083% Inhalation Nebu Soln Take 3 mL (2.5 mg total) by nebulization every 6 (six) hours as needed for Wheezing.  Disp: 1 Box Rfl: 3   Metoprolol Succinate ER 25 with you to make sure we are not missing anything? yes  • Are there any reasons that keep you from taking your medication as prescribed? No    Referrals/orders at D/C:  Home Health ordered at D/C? Yes   Has HH been set up?   Yes   If Yes: With Whom: [facility] ([facility])    Please come in through the [employer], stop at [facility] registration desk in the [facility] lobby to check-in for the appointment.   Then walk through the [facility] lobby [facility] [place] Mirian Worley [id number]  [facility] [address] [facility]  [phone]          PCP TCM/HFU appointment: scheduled at D/C within 7-14 days yes     NCM Reviewed/scheduled/rescheduled P

## 2017-09-21 NOTE — TELEPHONE ENCOUNTER
PER ED VISIT 9/16/17-Brief Synopsis: Patient was admitted for acute on chronic congestive heart failure with diuresis per cardiology. Was also seen by pulmonary medicine.   With his severe troponin elevation cardiology recommended monitoring but no aggress

## 2017-09-22 PROBLEM — R77.8 TROPONIN I ABOVE REFERENCE RANGE: Status: ACTIVE | Noted: 2017-01-01

## 2017-09-22 PROBLEM — I50.9 CHF (CONGESTIVE HEART FAILURE) (HCC): Status: ACTIVE | Noted: 2017-01-01

## 2017-09-22 NOTE — OCCUPATIONAL THERAPY NOTE
OCCUPATIONAL THERAPY EVALUATION - INPATIENT     Room Number: 2775/7112-Y  Evaluation Date: 9/22/2017  Type of Evaluation: Initial  Presenting Problem: CHF    Physician Order: IP Consult to Occupational Therapy  Reason for Therapy: ADL/IADL Dysfunction and is noted. No evidence of intracranial hemorrhage or extra-axial fluid collection.       Dictated by: Gisselle Meek MD on 9/19/2017 at 13:28       Approved by: Gisselle Meek MD      Problem List  Active Problems:    CHF (congestive heart failure) ( Medtronic PM  No date: REMOVAL GALLBLADDER    OCCUPATIONAL PROFILE    HOME SITUATION  Type of Home: House  Home Layout: One level (w basment, chair lift to basment)  Lives With: Spouse    Toilet and Equipment: Standard height toilet  Shower/Tub and Equipme Symmetrical  Coordination - Finger Opposition: Symmetrical       ACTIVITY TOLERANCE   O2 Saturation: 92%  O2 Device: High flow nasal cannula  Liters of O2:  3L  No shortness of breath    ACTIVITIES OF DAILY LIVING ASSESSMENT  AM-PAC ‘6-Clicks’ Inpatient Da Daily Activity Short Form was completed and  this patient  is demonstrating a  63.03% degree impairment in activities of daily living. Research supports that patients with this level of impairment often benefit from TRISTEN placement.  In this OT evaluation pat assessment

## 2017-09-22 NOTE — CONSULTS
[facility]  Report of Inpatient Wound Care Consultation     Stuart Gudino Patient Status:  Inpatient    1940 MRN [id number]   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 Durable Medical Equipment:  NA  Offloading/Footwear:  NA  Compression:  NA    Physical Therapy Wound Goals:  1. Maintain optimal wound healing environment  2. Remove wound bioburden  3.  Decrease periwound edema      PLAN OF CARE:   Continue t

## 2017-09-22 NOTE — HISTORICAL OFFICE NOTE
Selina Hernandez  [phone]  : 1940  ACCOUNT: [id number]  PCP: Linda Carr M.D. Hospital: [facility]  Admitted: 2017  Discharged: 2017    DISCHARGE SUMMARY    DISCHARGE DIAGNOSES:  1.  Elevated troponin with a peak at 10, most like also.      AARON Cartagenarolf Becerra  : 1940  ACCOUNT:  [id number]  [phone]  PCP: Dr. Kimberly Barlow    TODAY'S DATE: 2017  DICTATED BY:  Roselia James M.D.]    CHIEF COMPLAINT: [Followup of Dilated cardiomyopathy, Fo admissions is helping him get stronger with her cardiovascular status. The patient had a good cholesterol profile a couple of years ago. No recent check.      The patient has periodic blood work with hematologist.      His last chest x-ray was in [place] he cannot have it because of the cancer. In 2016, he had admissions for pneumonia, falls, and nondisplaced rib fractures. There was no need for any surgeries. Patient had admissions for CHF and pneumonias in 2016.   He cannot tolerate CPAP machine whic some calcifications, but nothing obstructive and minimal luminal irregularities in the left main and circumflex artery.     Patient also saw Dr. Dolly Gonzalez, but no specific treatment was recommended besides leg elevation and diuretic with stocking and patient BMI parameters reviewed and discussed. HEAD/FACE: no trauma and normocephalic. EYES: conjunctivae not injected and no xanthelasma. ENT: mucosa pink and moist. NECK: jugular venous pressure elevated. RESP: respirations with normal rate and rhythm.  GI: centr treatment recommended. I would not increase diuretics for this part. He is really doing well and I am pleased with the fact that he has stayed away from the hospital over the winter time. 10. Continue bronchodilators for stable COPD.    11. Since the pa

## 2017-09-22 NOTE — PROGRESS NOTES
[facility] Cardiology Progress Note    Please see Dr. Brian Duggan recent consult dated 9/16/17. Returned to hospital as was just not able to get around and function at home. Denies any chest pain or SOB, but very weak. Some ongoing intermittent double vision. control. Troponin elevation - from diffuse mismatch rather than acute coronary event primarily - chronic hypoxia (non-compliant with oxygen at home per wife and no CPAP); in addition bouts of tachycardia added to mismatch.  Troponin down from 18 to 17 an

## 2017-09-22 NOTE — PHYSICAL THERAPY NOTE
PHYSICAL THERAPY EVALUATION - INPATIENT     Room Number: 1531/9855-N  Evaluation Date: 9/22/2017  Type of Evaluation: Initial  Physician Order: PT Eval and Treat    Presenting Problem: CHF, fall, weakness  Reason for Therapy: Mobility Dysfunction and D Personal history of antineoplastic chemotherapy    • Personal history of arthritis    • Pneumonia, organism unspecified(486)    • RENAL DISEASE    • Renal disorder     ckd stage 3   • Renal failure, unspecified    • SLEEP APNEA    • Type II or unspecified safety    RANGE OF MOTION AND STRENGTH ASSESSMENT  Upper extremity ROM and strength are within functional limits see OT note, bilat rot cuff damage with shldr limitations    Lower extremity ROM is within functional limits     Lower extremity strength is wi based on fim def    Skilled Therapy Provided:  Pt recd sitting up in bs chair, ok to see per Yolanda Fall. Pt educated in role of PT, goals for session. Pt lethargic, but agreeable to PT. Pt with significant bilat LE edema, in recliner with LE's down.   Pt Mobility Short Form for the patient is 77% degree of basic mobility impairment. Research supports that patients with this level of impairment often benefit from TRISTEN.   Based on this evaluation, patient's clinical presentation is unstable and overall the ev

## 2017-09-22 NOTE — PLAN OF CARE
CARDIOVASCULAR - ADULT    • Maintains optimal cardiac output and hemodynamic stability Progressing    • Absence of cardiac arrhythmias or at baseline Progressing          A/O x 3  3 liters oxygen- home regimen  Cardiology to see  PT/OT   V pacing intermitt

## 2017-09-22 NOTE — ICD/PM
Medtronic dual chamber pacemaker check. Lead status in expected range. Mode switch episodes noted 0.8% of time. No ventricular high HR episodes. Stored episodes show what looks like AT, starting on 9/16, longest was 6 hours, still occurring today.

## 2017-09-22 NOTE — H&P
[facility] HOSPITALIST  History and Physical     Alber Gamez Patient Status:  Inpatient    1940 MRN [id number]   [facility] 2NE-A Attending Kaleb Worley MD   Hosp Day # 0 PCP Lula Emanuel MD     Chief Complaint: weakness    Histo Exposure to radiation    • Fitting and adjustment of cardiac pacemaker    • Ganglion, unspecified     GLOBAL EXP / 07/07/2014 / RIGHT WRIST / SMITH 3/31/2014   • Gout    • High cholesterol    • History of blood transfusion    • Hyperlipidemia    • Lipid s MG Oral Tab Take 0.5 mg by mouth 3 (three) times daily as needed for Sleep or Anxiety. Disp:  Rfl:    pembrolizumab (KEYTRUDA) 100 MG/4ML Intravenous Solution Inject 2 mg/kg into the vein every 21 days.  Disp:  Rfl:    spironolactone 25 MG Oral Tab Take 1 t Take 500 mcg by mouth daily. Disp:  Rfl:    JEFFREY Complex-C-Folic Acid (NEPHRO-ARABELLA RX) 1 MG Oral Tab Take 1 tablet by mouth daily with breakfast. Disp:  Rfl:    ferrous sulfate 325 (65 FE) MG Oral Tab EC Take 325 mg by mouth every evening.    Disp:  Rfl:    Bu auscultation bilaterally. No wheezes. No rhonchi. Cardiovascular: S1, S2. Regular rate and rhythm. No murmurs, rubs or gallops. Equal pulses. Chest and Back: No tenderness or deformity. Abdomen: Soft, nontender, nondistended. Positive bowel sounds.  No after discharge, patient will require TBD.

## 2017-09-22 NOTE — PROGRESS NOTES
[facility] HOSPITALIST  Progress Note     Kumar May Patient Status:  Inpatient    1940 MRN [id number]   [facility] 2NE-A Attending Aletha Turcios MD   Hosp Day # 0 PCP Sarabjit Blandon MD     Chief Complaint: neck pain    S: Patient repo mg Oral BID   • insulin detemir  10 Units Subcutaneous Daily   • Metoprolol Succinate ER  12.5 mg Oral QPM   • Pantoprazole Sodium  40 mg Oral QAM AC   • doxercalciferol  0.5 mcg Oral Daily   • atorvastatin  20 mg Oral Nightly   • spironolactone  25 mg Ora

## 2017-09-22 NOTE — PROGRESS NOTES
NURSING ADMISSION NOTE      Patient admitted via Cart  Oriented to room. Safety precautions initiated. Bed in low position. Call light in reach. Direct admit from [facility] for fall at home d/t weakness. Pt arrived via EMS. VSS.  Uneventful tra

## 2017-09-22 NOTE — CM/SW NOTE
09/22/17 1500   CM/SW Referral Data   Referral Source Physician;Social Work (self-referral)   Reason for Referral Discharge planning   Informant Patient   Pertinent Medical Hx   Primary Care Physician Name Guerline Anders   Patient Info   Patient's Mental Status A

## 2017-09-23 NOTE — PROGRESS NOTES
[facility] HOSPITALIST  Progress Note     Kevin Kilgore Patient Status:  Inpatient    1940 MRN [id number]   [facility] 2NE-A Attending Jordyn Martinez MD   Hosp Day # 1 PCP Cori Galeazzi, MD     Chief Complaint: neck pain    S: Patient mise 10 Units Subcutaneous Daily   • Pantoprazole Sodium  40 mg Oral QAM AC   • doxercalciferol  0.5 mcg Oral Daily   • atorvastatin  20 mg Oral Nightly   • Umeclidinium Bromide  1 puff Inhalation Daily   • torsemide  40 mg Oral Daily   • nystatin   Topical BID

## 2017-09-23 NOTE — OCCUPATIONAL THERAPY NOTE
OCCUPATIONAL THERAPY TREATMENT NOTE - INPATIENT     Room Number: 9291/2985-L  Session:  1  Number of Visits to Meet Established Goals:  Other (Comment) (TBD)    Presenting Problem: CHF    History related to current admission (copied from PT evaluation): Personal history of arthritis    • Pneumonia, organism unspecified(486)    • RENAL DISEASE    • Renal disorder     ckd stage 3   • Renal failure, unspecified    • SLEEP APNEA    • Type II or unspecified type diabetes mellitus without mention of complicatio tested    Skilled Therapy Provided: Attempted to administer MOCA, but pt did not have his reading glasses. Pt will ask his wife to bring a pair today.   Pt was given pain medication about an hour prior to this session, but pt reported pain \"all over,\" an Short blessed test or MOCA assessment-attempted on 9/23, needs reading glasses.

## 2017-09-23 NOTE — CM/SW NOTE
Received call from patient's wife, Sidra Ware. She toured [employer] (TRISTEN) this morning, and was hoping for more options. SW provided TRISTEN list and contact information once decision has been made.      HALLIE Townsend, GEOVANYW  P: [phone]  [address]: [address], Pager:

## 2017-09-23 NOTE — PROGRESS NOTES
[facility] Cardiology Progress Note    Subjective:  Feels achy all over.      Objective:  /35 (BP Location: Left arm)   Pulse 98   Temp 98.5 °F (36.9 °C) (Oral)   Resp 18   Wt 245 lb 6 oz (111.3 kg)   BMI 32.37 kg/m²     Telemetry: V pace, currently v pa CAD in the past.     No plan for invasive cardiac procedures. Comorbidity with lung cancer. Echo last week - no new wall motion abnormalities.  LVEF from 30-35% to 25-30%, which could be due to hypoxia and tachy episodes.     Plan for medical tune up, no pr

## 2017-09-24 NOTE — SLP NOTE
ADULT SWALLOWING EVALUATION    ASSESSMENT    ASSESSMENT/OVERALL IMPRESSION:  Orders received for bedside swallow evaluation. Per chart review:  27-year-old white male who presents emerged from today for complaint of difficulty breathing.   Patient states he at bedside, recommended pt be NPO until a VFSS can be completed. A VFSS is recommended to objectively assess swallow function, r/o aspiration and determine safest/least restrictive means of nutrition/hydration.  Discussed results of evaluation and recommend • OTHER DISEASES     periph neuropathy legs   • Pain in joint, lower leg    • Personal history of antineoplastic chemotherapy    • Personal history of arthritis    • Pneumonia, organism unspecified(486)    • RENAL DISEASE    • Renal disorder     ckd stag 4  Follow Up Needed: Yes  SLP Follow-up Date: 09/25/17    Current status G Code: Initial, Swallowing, CL: 60-79% impaired, limited, or restricted  Goal status G Code: Swallowing, CL: 60-79% impaired, limited, or restricted    Thank you for your referral.

## 2017-09-24 NOTE — PLAN OF CARE
Problem: Swallowing Difficulty (NC-1.1)  Goal: Initial eval performed  NPO  Outcome: Not Progressing

## 2017-09-24 NOTE — PROGRESS NOTES
[facility] HOSPITALIST  Progress Note     Quincy Cordova Patient Status:  Inpatient    1940 MRN [id number]   [facility] 2NE-A Attending Nayely Mcfarland MD   [facility] Day # 2 PCP Andrade Jerome MD     Chief Complaint: neck pain    S: Patient mise BuPROPion HCl ER (SR)  150 mg Oral Nightly   • febuxostat  80 mg Oral Daily   • ferrous sulfate  325 mg Oral QPM   • gabapentin  600 mg Oral BID   • insulin detemir  10 Units Subcutaneous Daily   • Pantoprazole Sodium  40 mg Oral QAM AC   • doxercalciferol soft  Neuro: weakness      Aby Ackerman MD

## 2017-09-24 NOTE — PLAN OF CARE
Problem: CARDIOVASCULAR - ADULT  Goal: Maintains optimal cardiac output and hemodynamic stability  INTERVENTIONS:  - Monitor vital signs, rhythm, and trends  - Monitor for bleeding, hypotension and signs of decreased cardiac output  - Evaluate effectivenes ATTENDED AND WILL CONTINUE TO MONITOR. CALL LIGHT WITHIN REACH.

## 2017-09-24 NOTE — PLAN OF CARE
Pt. C/o pain in his neck. He is unable to hold his head up. Tylenol and neurontin given. He is alert and oriented times three, but withdrawn. He is weak and takes assist of two persons to stand.  He was unable to work with physical therapy this AM  Due to h

## 2017-09-24 NOTE — PROGRESS NOTES
[facility] Cardiology Progress Note    Subjective:  Does not feel good. General achiness and anxious when in bed, wants his legs over the side. He appears a little sob, but he denies any trouble breathing.      Objective:  BP 95/46 (BP Location: Left arm)   Pu general weakness was placed NPO.     He denies angina sx, no worsening dyspnea and no CP but rather very weak. He went to ER post mechanical fall. I think his recent chemo agent contributed to clinical picture, not much cardiac reserve.     Dose of Talv

## 2017-09-24 NOTE — CONSULTS
[facility]  Report of Consultation    Eual Bio Patient Status:  Inpatient    1940 MRN [id number]   [facility] 2NE-A Attending Ruddy Cheung MD   Hosp Day # 2 PCP Johnny Maharaj MD     Reason for Consultation:  DAVIS/CKD  Hypon failure, unspecified    • SLEEP APNEA    • Type II or unspecified type diabetes mellitus without mention of complication, not stated as uncontrolled    • Unspecified sleep apnea    • Visual impairment      Past Surgical History:  No date: ANGIOGRAM  No claire mg, 600 mg, Oral, BID  •  insulin detemir (LEVEMIR) 100 UNIT/ML flextouch 10 Units, 10 Units, Subcutaneous, Daily  •  Pantoprazole Sodium (PROTONIX) EC tab 40 mg, 40 mg, Oral, QAM AC  •  doxercalciferol (HECTOROL) cap 0.5 mcg, 0.5 mcg, Oral, Daily  •  ator reviewed and otherwise unremarkable. Physical Exam:  Vital signs: Blood pressure 100/54, pulse 78, temperature 99.1 °F (37.3 °C), temperature source Oral, resp. rate 20, weight 245 lb 6 oz, SpO2 95 %. General: No acute distress.  Alert and oriented; yesika Most recent [phone] in 2016 with normal kidneys  - check bladder scan to assess PVR  - check urine chem  - agree w/ holding further diuetics for now  2. Hyponatremia - unclear etiology; suspect low solute intake contributing.  Concern for underlying SIADH due to

## 2017-09-25 NOTE — CONSULTS
[facility]  Report of Consultation    Briana Singh Patient Status:  Inpatient    1940 MRN [id number]   [facility] 4SW-A Attending Yony Aguillon MD   [facility] Day # 3 PCP Kimberly Barlow MD     Reason for Consultation: Hypotension, pressure ventilation and it was clarified with the family that he is not to be intubated. Follow-up blood gases demonstrated significant improvement in his PaCO2 and his pH (along with improvement in his elevated potassium).   Unfortunately he has not beco Take 81 mg by mouth daily.  Three times weekly Disp:  Rfl:  Past Week at 0900   Pantoprazole Sodium 40 MG Oral Tab EC Take 1 tablet (40 mg total) by mouth every morning before breakfast. Disp: 90 tablet Rfl: 2 9/21/2017 at 0900   simvastatin (ZOCOR) 40 MG O times daily. Disp:  Rfl:  9/21/2017 at Unknown time   Tiotropium Bromide Monohydrate 18 MCG Inhalation Cap Inhale 18 mcg into the lungs daily.  handhaler  Disp:  Rfl:  9/21/2017 at Unknown time   erythromycin 5 MG/GM Ophthalmic Ointment  Disp:  Rfl:  Unknow clear.  No scleral icterus. No conjunctival     hemorrhage. Nose: Nares normal.   Throat: Lips, mucosa, and tongue normal.  No thrush noted. Neck: Soft, supple neck; trachea midline, no adenopathy, no thyromegaly.    Lungs: Breath sounds are decreased, patient is in no state to go to radiology for evaluation. An EEG will be obtained at the bedside.   · COPD  · Bronchogenic carcinoma for which he is recently started immunotherapy  · Ischemic heart disease  · Chronic systolic congestive heart failure with

## 2017-09-25 NOTE — PROGRESS NOTES
Pharmacy Note: Renal dose adjustment of Fide Dunne is a 65year old male who has been prescribed Merrem. CrCl is 18.2 ml/min so the dose has been adjusted  to 500mg IV q12h per hospital renal dose adjustment protocol.   Pharmacy will follow

## 2017-09-25 NOTE — CM/SW NOTE
Clarification of my earlier notation:  TIMUR Arnold has been managing case and spoken at length with family. CM / SW available to follow if needed for needs / support.   Ruth Meza, 09/25/17, 3:31 PM

## 2017-09-25 NOTE — RESPIRATORY THERAPY NOTE
Art line placed in right radial with 20g arrow needle. Threaded without resistance. Good blood return.

## 2017-09-25 NOTE — CONSULTS
[facility] dosing service    Initial Pharmacokinetic Consult for Vancomycin Dosing     Faustino Joseph is a 65year old male who is being treated for sepsis.   Pharmacy has been asked to dose Vancomycin by Dr. Frandy Washington Sr    He is allergic to amoxicill Vancomycin to assess renal function. 4.  Pharmacy will follow and monitor renal function changes, toxicity and efficacy. Pharmacy will continue to follow him. We appreciate the opportunity to assist in his care.     Victor Manuel Rosado, PharmD  9/25/2017

## 2017-09-25 NOTE — PROGRESS NOTES
[facility] HOSPITAL[facility]  Progress Note     [employer] Patient Status:  Inpatient    1940 MRN [id number]   [facility] 2NE-A Attending Ben Hickman MD   Hosp Day # 3 PCP Molly Cheadle, MD     Chief Complaint: AMS/hypotension    S: Shanon Skelton Value Date   TSH 2.060 09/24/2017       Imaging: Imaging data reviewed in Epic.     Medications:   • meropenem  500 mg Intravenous Q12H   • hydrocortisone Na succinate PF  100 mg Intravenous Q8H [address]   • ipratropium-albuterol  3 mL Nebulization 6 times per d spouse, RN, ICU apn, pulm. Helena DING  7:56 AM       Addendum:    I spoke with 3 dtrs/spouse in waiting room. They would like to talk to cardiologist first before deciding. I updated Dr. Terri Mercado.       Helena DING  10:37 AM     Addendum

## 2017-09-25 NOTE — PROGRESS NOTES
39 Manju Garcia - Cardiology Progress Note  Critical Care 11:05am to 11:54 am     Stuart Shahab Patient Status:  Inpatient    1940 MRN [id number]   [facility] 4SW-A Attending Shemar Connor MD   [facility] Day # 3 PCP Obie Frank 164.0   --    INR   --   1.11       Recent Labs   Lab  09/24/17   0917  09/24/17   1818  09/24/17   2322  09/25/17 0228  09/25/17   0432   NA  125*  126*  126*  130*  129*   K  6.0*  6.1*  5.8*  5.7*  5.6*   CL  90*  92*  92*  93*  93*   CO2  20.0*  21. arterial trunk suggesting pulmonary arterial hypertension. Clinical correlation recommended. Please see above for further details.   Dictated by: Marlene Hughes MD on 9/19/2017 at 13:38     Approved by: Marlene Hughes MD            Ct Brain Or Head (236 and less likely related to bursitis. 3. Probable rupture of the biceps tendon.     Dictated by: Fidel Good MD on 9/15/2017 at 10:34     Approved by: Fidel Good MD              Medications:    Current Facility-Administered Medications:  DOBUTamine in Min PRN   Or      glucose (DEX4) oral liquid 30 g 30 g Oral Q15 Min PRN   Or      Glucose-Vitamin C (DEX-4) 4-0.006 g chewable tab 8 tablet 8 tablet Oral Q15 Min PRN   nystatin (MYCOSTATIN) 914216 UNIT/GM ointment  Topical BID   ondansetron HCl (ZOFRAN) in appropriate here, by patient may not make another 24 to 48 hrs - we will try to keep his BP up and CO2 down which made him better temporarily so can communicate with family and let the family enjoy being with him for a day or two - no withdrawal of treatme

## 2017-09-25 NOTE — PROGRESS NOTES
Called by patient's RN stating that patient's SBP was in the 76s. Upon arriving to patient's room, arterial line reading 82/35 and cuff reading 81/39 and patient was on 12 mcg/kg/min of dobutamine and 30 mcg/min of levophed.  Asked RN to decrease dobutam

## 2017-09-25 NOTE — CONSULTS
[facility]  Progress Note    Keyanna Hameed Patient Status:  Inpatient    1940 MRN [id number]   [facility] 2NE-A Attending Priscila Lu MD   [facility] Day # 3 PCP Júnior Klein MD     Acute issues overnight reviewed with primary serv PRN   insulin detemir (LEVEMIR) 100 UNIT/ML flextouch 10 Units 10 Units Subcutaneous Daily   Pantoprazole Sodium (PROTONIX) EC tab 40 mg 40 mg Oral QAM AC   glucose (DEX4) oral liquid 15 g 15 g Oral Q15 Min PRN   Or      Glucose-Vitamin C (DEX-4) 4-0.006 g 110*   CREATSERUM 2.72* 3.35* 3.79* 3.92* 3.91*   CA 9.1 8.9 8.9 9.2 7.9*   MG  --   --  2.3  --  2.4   PHOS  --   --  7.5*  --   --          Recent Labs   09/24/17 2322 09/25/17  0228   * 413*   * 456*   ALB 2.5* 2.3*         Imaging:  Revi

## 2017-09-25 NOTE — PHYSICAL THERAPY NOTE
Noting events of last night, with  Increasing lethargy and respiratory distress.   Pt started on levophed for hypotension, and on BiPap this am.  Per chart, it appears that pt spouse is awaiting arrival of 3 daughters for decision making regarding plan of c

## 2017-09-25 NOTE — CM/SW NOTE
Received call from JOY LIZ, advising that currently patient is critically ill, now DNR. Physicians in conversations today with family. Continuing care / pressors pending family to arrive.   Per Atiya Parada, family aware pt could pass at any time and may w

## 2017-09-25 NOTE — SLP NOTE
Video swallow study cancelled due to change in status requiring increases respiratory support. Pt is not appropriate for speech therapy services at this time. Will continue to follow.      Jennifer Guerra M.S. [id number] [facility]  Pager [phone]

## 2017-09-25 NOTE — PROGRESS NOTES
ICU  Critical Care APN Progress Note    NAME: Brittany Bailey - ROOM: [address] [place], [place] [address]A - MRN: [id number] - Age: 65year old - :1940    History Of Present Illness:  Brittany Bailey is a 65year old male with PMHx significant for HTN, CHF, Dilated cardiomyo started per cardiology. Patient remaining minimally responsive.     PMH:  Past Medical History:   Diagnosis Date   • Actinic keratosis    • Anxiety state    • Arrhythmia     bradycardia, s/p pacer now   • Cancer ([facility] [address] 1/2016    lung chemo/radiation suppose stimulation only, not following commands, no acute distress, appears stated age  Neck: No JVD, neck supple, no adenopathy, trachea midline, no carotid bruits  Lungs: Scattered rhonchi bilaterally, diminished breath sounds to left lung fields, respirations >90mmHg, MAP>65 given CHF  - Antibiotics: Meropenem for empiric coverage pending culture results  - IVF: No IVF bolus 2/2 CHF.  Receiving continuous rate 60mL/hour  - Blood Cultures x2, urine culture before Antibiotics given  - Recheck CBC, lactic acid, pro [address]

## 2017-09-25 NOTE — PLAN OF CARE
Assumed care for patient at 2000 on 9/24. VSS on 3-4L NC. Patient was drowsy/lethargic at shift change. When RN assessed patient around 2130, patient was still very lethargic with increased respirations and accessory muscle use.  Patient opened eyes to voic

## 2017-09-25 NOTE — PROGRESS NOTES
09/25/17 1150   Clinical Encounter Type   Visited With Family   Continue Visiting ( available for continuity of care at pager [phone],as needed/requested. )   Quaker Encounters   Quaker Needs Prayer  ( provided prayer.)   Patient Spir

## 2017-09-25 NOTE — OCCUPATIONAL THERAPY NOTE
Patient transferred from [address] [place] [address] to [facility] on 9/24/17 @2255 d/t worsening lethargy. Patient was placed on BIPAP and started on dobutamine and levophed. Will place patient on hold for OT services.   Will require new OT eval and treat orders once medically approp

## 2017-09-25 NOTE — PROGRESS NOTES
[facility]  Progress Note     Keyanna Yioleon Patient Status:  Inpatient    1940 MRN [id number]   [facility] 2NE-A Attending Priscila Lu MD   Hosp Day # 2 PCP Júnior Klein MD     Chief Complaint: sob    S: Patient is Jace Lopez breakfast   • Fluticasone Furoate-Vilanterol  1 puff Inhalation QAM   • BuPROPion HCl ER (SR)  150 mg Oral Nightly   • febuxostat  80 mg Oral Daily   • ferrous sulfate  325 mg Oral QPM   • gabapentin  600 mg Oral BID   • insulin detemir  10 Units Subcutane

## 2017-09-26 NOTE — PROGRESS NOTES
[facility]  Progress Note     [employer] Patient Status:  Inpatient    1940 MRN [id number]   [facility] 2NE-A Attending Ben Hickman MD   [facility] Day # 4 PCP Molly Cheadle, MD     Chief Complaint: AMS/hypotension    S: [employer] Clearance: 20.9 mL/min (based on SCr of 3.29 mg/dL (H)). Recent Labs   Lab  09/25/17   0229   PTP  14.3   INR  1.11       No results for input(s): TROP, CK in the last 72 hours. Imaging: Imaging data reviewed in Epic.     Medications:   • Insulin A heparin  · CODE status: DNR     Plan of care discussed with patient's spouse, 3 dtrs, RN, cardiologist at bedside.   Patient seems to have stabilized overnight but family aware he still has a lot of underlying chronic illnesses and risk for aspiration at th

## 2017-09-26 NOTE — PROGRESS NOTES
39 Manju Garcia - Cardiology Progress Note  Critical Care 11:05am to 11:54 am     Tripp Sisi Patient Status:  Inpatient    1940 MRN [id number]   [facility] 4SW-A Attending Sang Martins MD   [facility] Day # 4 PCP Sean Bustamante 1.11   --        Recent Labs   Lab  09/24/17   2322  09/25/17   0228  09/25/17   0432  09/25/17   1510  09/26/17   0509   NA  126*  130*  129*  127*  131*   K  5.8*  5.7*  5.6*  5.3*  5.4*   CL  92*  93*  93*  89*  91*   CO2  21.0*  22.0  20.0*  27.0  31 15 g Oral Q15 Min PRN   Or      Glucose-Vitamin C (DEX-4) 4-0.006 g chewable tab 4 tablet 4 tablet Oral Q15 Min PRN   Or      dextrose 50% injection 50 mL 50 mL Intravenous Q15 Min PRN   Or      glucose (DEX4) oral liquid 30 g 30 g Oral Q15 Min PRN   Or    · Respiratory failure with acidosis, lethargy, CO2 retention, possible aspiration over the weekend, hypoxia, hypotension - transferred to ICU - in shock  · Hypotension - in shock  · Troponin elevation - from diffuse mismatch rather than acute coronary

## 2017-09-26 NOTE — PHYSICAL THERAPY NOTE
PHYSICAL THERAPY RE-EVALUATION - INPATIENT     Room Number: 460/460-A  Evaluation Date: 9/26/2017  Type of Evaluation: Re-evaluation  Physician Order: PT Eval and Treat    Presenting Problem: A hypercapneic RF, Shock, sustpected cardiogenic and possibl behavioral disturbance    Chest wall pain    Hyperkalemia    Stage 3 chronic kidney disease    Cardiomyopathy ([facility] [address]    Anemia    Acidosis      Past Medical History  Past Medical History:   Diagnosis Date   • Actinic keratosis    • Anxiety state    • Arrhyt lift to basment)  Stairs to Enter : 3     Stairs to Bedroom: 0       Lives With: Spouse  Drives: Yes  Patient Owned Equipment: Cane  Patient Regularly Uses: Reading glasses    Prior Level of Clarke: pt reports ind pta, amb with cane, stays on main le position of bed; 107/79 with return to semi-supine. Respiratory Rate: 86-09    AM-PAC '6-Clicks' INPATIENT SHORT FORM - BASIC MOBILITY  How much difficulty does the patient currently have. ..  -   Turning over in bed (including adjusting bedclothes, sheet half side roll to right for pillows to weight shift off bottom. Pt head supported in neutral with slight head rotation to right. Pt made comfortable; but then started c/o slight dizziness.   Called nursing, and HR began showing PVC's, and \"tachycardia\" level of impairment may benefit from skilled inpt PT; The patient scores 0/20 on the Elderly Mobility Scale, indicating patient is dependent in terms of safe mobility.        Based on this evaluation, patient's clinical presentation is unstable and over

## 2017-09-26 NOTE — SLP NOTE
Spoke to patient's RNs, Brayden Schultz and Zachary, prior to entering the patient's room. Patient is currently off of BiPap and only on for PRN and at night. Per RN, patient weak and still requiring use of [place] to assist with secretions and phlegm.  RN verbally report

## 2017-09-26 NOTE — PROGRESS NOTES
[facility]  Progress Note    Perez Evangelista Patient Status:  Inpatient    1940 MRN [id number]   [facility] 4SW-A Attending Eddie Dumont MD   [facility] Day # 4 PCP Phoebe Oliveira MD     STATUS UPDATE: Over the last 24 hours, the montez 29.0*   MCV  105.3*  100.3*   MCH  34.3*  34.6*   MCHC  32.6  34.5   RDW  18.1*  18.0*   NEPRELIM  16.08*  21.48*   WBC  18.0*  22.6*   PLT  164.0  157.0     Recent Labs   Lab  09/25/17   0432  09/25/17   1510  09/26/17   0509   GLU  234*  386*  117*   BUN

## 2017-09-27 PROBLEM — R57.9 SHOCK (HCC): Status: ACTIVE | Noted: 2017-01-01

## 2017-09-27 NOTE — CONSULTS
[facility]  Progress Note    Quincy Cordova Patient Status:  Inpatient    1940 MRN [id number]   [facility] 2NE-A Attending Nayely Mcfarland MD   [address] Day # 5 PCP Andrade Jerome MD     Looks much improved from Monday am  Awake/alert HYDROcodone-acetaminophen (NORCO) 5-325 MG per tab 1 tablet 1 tablet Oral Q6H PRN   albuterol sulfate (VENTOLIN) (2.5 MG/3ML) 0.083% nebulizer solution 2.5 mg 2.5 mg Nebulization Q6H PRN   ALPRAZolam (XANAX) tab 0.5 mg 0.5 mg Oral TID PRN   glucose (DEX4 09/25/17  0432 09/25/17  1510 09/26/17  0509 09/27/17  0431   * 130* 129* 127* 131* 138   K 5.8* 5.7* 5.6* 5.3* 5.4* 3.8   CL 92* 93* 93* 89* 91* 95*   CO2 21.0* 22.0 20.0* 27.0 31.0 31.0   * 106* 110* 107* 107* 110*   CREATSERUM 3.79* 3.92* 3

## 2017-09-27 NOTE — PROGRESS NOTES
[facility]  Progress Note     Marques Donahue Patient Status:  Inpatient    1940 MRN [id number]   [facility] 2NE-A Attending Melissa Low MD   Hosp Day # 5 PCP Rani Husain MD     Chief Complaint: AMS/hypotension    S: Haley Bull Lab  09/25/17   0229   PTP  14.3   INR  1.11       No results for input(s): TROP, CK in the last 72 hours. Imaging: Imaging data reviewed in Epic.     Medications:   • hydrocortisone Na succinate PF  50 mg Intravenous Q12H   • acetylcysteine  2 mL N closely on fluids, renal function improved, high risk for decline due to resp failure from CHF.   Too fatigued for SLP arielle.  Family reports xanax usually helps restlessness but doesn't seem to be working this am.  They were concerned about his discomfort t edema.   Non-small cell lung cancer on immunotherapy  Diabetes mellitus type 2  Acute kidney injury on chronic kidney disease stage III  Hyponatremia-improved    Patricia Rowe MD  9/27/2017  2:40 PM

## 2017-09-27 NOTE — CONSULTS
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 Palliative Care  Yanni Lambert, pager [phone] will follow for advance care planning and bedside counseling as needed.     Spiritual needs addressed: Referral placed for [place]

## 2017-09-27 NOTE — DIETARY NOTE
[address] ASSESSMENT    Pt is at moderate nutrition risk. Pt does not meet malnutrition criteria.     NUTRITION DIAGNOSIS/PROBLEM:    Unintentional weight loss related to inability to consume sufficient PO as evidenced by weight l 32.49     2.  Fluid Accumulation: 3+ lower extremity per MD note    NUTRITION PRESCRIPTION: per IBW  Calories: 0179-4045 calories/day (25-30 calories per kg)  Protein: 108-124 grams protein/day (1.3-1.5 grams protein per kg)  Fluid: ~1 ml/kcal or per MD dis

## 2017-09-27 NOTE — PROGRESS NOTES
Radha Gramajo on telephone call back list this morning.   No call made / Radha Gramajo Inpatient room 460

## 2017-09-27 NOTE — PALLIATIVE CARE NOTE
Spoke with pt and family several times throughout the day regarding goals of care. At this time they are requesting comfort care. Candace Orona and Lamberto Milder notified. Comfort orders written. Full note to follow.

## 2017-09-27 NOTE — PROGRESS NOTES
[facility]  Progress Note    Luke Blend Patient Status:  Inpatient    1940 MRN [id number]   [facility] 4SW-A Attending Nadia Ashley MD   [facility] Day # 5 PCP Panda Almanzar MD       Assessment:    · Septic shock  · Shock Liver 292 09/27/2017   MG 2.3 09/27/2017   PHOS 4.7 09/27/2017       Medications:    • hydrocortisone Na succinate PF  50 mg Intravenous Q12H   • acetylcysteine  2 mL Nebulization Q8H [address]   • Insulin Aspart Pen  4-20 Units Subcutaneous 6 times per day   • merope

## 2017-09-27 NOTE — PROGRESS NOTES
[facility]  Nephrology Progress Note    Faustino Joseph Attending:  Colin Ayoub MD       Assessment and Plan:    1) DAVIS- due to to hypotension / shock / cardiorenal syndrome; improved overnight with reasonable UO on IVF / pressors / medical mgmt. 29.0 09/26/2017   .0 09/26/2017   CREATSERUM 3.29 09/26/2017    09/26/2017    09/26/2017   K 5.4 09/26/2017   CL 91 09/26/2017   CO2 31.0 09/26/2017    09/26/2017   CA 8.4 09/26/2017   ALB 2.2 09/26/2017   ALKPHO 94 09/26/2017 Sodium Polystyrene Sulfonate (KAYEXALATE) 15 GM/60ML suspension 30 g 30 g Oral Once   calcium chloride 1 g in sodium chloride 0.9% 100 mL IVPB 1 g Intravenous Once   calcium gluconate 0.5 g in sodium chloride 0.9 % 100 mL IVPB 0.5 g Intravenous Once   HY

## 2017-09-27 NOTE — PROGRESS NOTES
[facility]  Progress Note    Christianne Nguyen Patient Status:  Inpatient    1940 MRN [id number]   [facility] 4SW-A Attending Jose Manuel Reveles MD   [facility] Day # 5 PCP Shabbir Queen MD     Subjective:  Christianne Nguyen is a(n) 65 year ol MCH  34.3*  34.6*  34.8*   MCHC  32.6  34.5  34.2   RDW  18.1*  18.0*  18.4*   NEPRELIM  16.08*  21.48*  14.67*   WBC  18.0*  22.6*  16.2*   PLT  164.0  157.0  130.0*     Recent Labs   Lab  09/25/17   1510  09/26/17   0509  09/27/17   0431   GLU  386*  1

## 2017-09-27 NOTE — SLP NOTE
ADULT SWALLOWING EVALUATION    ASSESSMENT    ASSESSMENT/OVERALL IMPRESSION:  Pt seen this PM for reevaluation of swallow. Pt seen sitting in chair, pt demonstrates severe kyphotic posture with inability to sit upright due to pain.  Pt's daughter and wife pr syndrome      Past Medical History  Past Medical History:   Diagnosis Date   • Actinic keratosis    • Anxiety state    • Arrhythmia     bradycardia, s/p pacer now   • Cancer ([facility] [address] 1/2016    lung chemo/radiation supposed to start 3/22/16   • CONGESTIVE HEAR scarring again noted overall not significantly changed. Stable small bilateral pleural effusions.      Impression     CONCLUSION:  No significant change.          SUBJECTIVE       OBJECTIVE   ORAL MOTOR EXAMINATION  Dentition: Functional  Symmetry: Within F

## 2017-09-28 NOTE — DISCHARGE SUMMARY
[facility] [place] HOSPITALIST  DISCHARGE SUMMARY     Colette Kay Patient Status:  Inpatient    1940 MRN [id number]   [facility] 4SW-A Attending Melissa Rossi MD   [address] Day # 6 PCP Asif Orantes MD     Date of Admission: 2017  Date of Cody Frausto discharged. He does note that his legs appear a little bit more swollen than his normal but has not changed since his discharge. He states that he is very weak and is unable to ambulate. In the ER at [facility] he did receive Lasix.     Brief S and patient pronounced and discussed with family including patient's wife and daughters at bedside  and offered condolensces and support, family stated they are happy patient had a peaceful and comfortable passing and that they are very grateful for his ca

## 2017-09-28 NOTE — PROGRESS NOTES
09/28/17 0934   Clinical Encounter Type   Visited With Family   Crisis Visit Death   Roman Catholic Encounters   Roman Catholic Needs Prayer  ( provided prayer. )   Patient Spiritual Encounters   Dignified Life Closure  assessed family grief proce

## 2017-09-28 NOTE — PROGRESS NOTES
[facility]  Progress Note     Luke Blend Patient Status:  Inpatient    1940 MRN [id number]   [facility] 2NE-A Attending Oralia Silva MD   Hosp Day # 6 PCP Panda Almanzar MD     Chief Complaint: AMS/hypotension    S: Giovanny Schmid respiratory failure  chronic systolic heart failure with dilated cardiomyopathy with EF of 25%. Hypotension secondary cardiogenic shock with possible sepsis with septic shock due to possible pneumonia also contributing– blood culture has been negative .

## 2017-09-28 NOTE — PALLIATIVE CARE NOTE
[address] Follow Up      Brittany Bailey  [id number]       Patient seen and evaluated, family at bedside. Pt is unresponsive to verbal or tactile stimuli. Respirations even and unlabored, appears comfortable.     Assessment/

## 2017-09-28 NOTE — PLAN OF CARE
Assumed care of patient at 0700. Patient denying pain but is \"uncomfortable\", frequent repositioning, got up to recliner with sit to stand. This evening patient was made comfort care; initiated Fentanyl gtt. Comfort care orders in place.

## 2017-09-28 NOTE — PROGRESS NOTES
The patient passed away this morning. I met with the family, wife and the daughters since I have known Gray Zapata for almost a decade. He had a significant comorbidity and no chances to recover with cardiac, renal and respiratory failure fighting lung cancer.

## 2017-09-28 NOTE — PLAN OF CARE
Assumed care of patient at 0700. Patient unresponsive, comfort measures in place, on Fentanyl gtt, appears comfortable. Wife at bedside. 0751 asystole on tele. Absent pulse and respirations. Teri [place], palliative care APN at bedside with wife.

## 2017-09-28 NOTE — SLP NOTE
Pt transitioning to comfort care at this time. Will sign off. Thank you.     Lindy Alonso M.S. [id number] [facility]  Pager [phone]

## 2017-09-28 NOTE — PLAN OF CARE
1900: Pt received AOx4. 5L NC. V-Paced w/ BBB. Fentanyl drip. TLC. Midline. Comfort care. Gates. 2100: Increase Fentanyl to 50 mcg/hr for comfort. Pt's daughter at bedside. 0400: Turn and mouth care as needed. VSS.  0530: Pt lethargic, comfortable.  Ranulfo Bangura

## 2017-09-29 ENCOUNTER — HOSPITAL ENCOUNTER (OUTPATIENT)
Dept: CARDIOLOGY CLINIC | Facility: HOSPITAL | Age: 77
End: 2017-09-29

## 2017-10-02 ENCOUNTER — PRIOR ORIGINAL RECORDS (OUTPATIENT)
Dept: OTHER | Age: 77
End: 2017-10-02

## 2017-10-10 ENCOUNTER — HOSPITAL ENCOUNTER (OUTPATIENT)
Dept: CARDIOLOGY CLINIC | Facility: HOSPITAL | Age: 77
End: 2017-10-10

## 2019-02-28 VITALS — DIASTOLIC BLOOD PRESSURE: 64 MMHG | SYSTOLIC BLOOD PRESSURE: 106 MMHG | HEART RATE: 65 BPM

## 2019-03-01 VITALS
SYSTOLIC BLOOD PRESSURE: 124 MMHG | BODY MASS INDEX: 35.92 KG/M2 | WEIGHT: 271 LBS | HEIGHT: 73 IN | DIASTOLIC BLOOD PRESSURE: 58 MMHG | HEART RATE: 84 BPM

## (undated) NOTE — IP AVS SNAPSHOT
[facility] [place] [address] [place], [address] ~ [phone]                After Visit Summary   5/9/2017    Zenia Gold    MRN: [id number]           Visit Information        Provider Department    5/9/2017  9:00 AM HEART Eather Sheridan Insulin Glargine (LANTUS SOLOSTAR) 100 UNIT/ML Subcutaneous Solution Pen-injector Inject 15 units into the skin daily and once steroids completed return to 10 units daily.     insulin aspart (NOVOLOG FLEXPEN) 100 UNIT/ML Subcutaneous Solution Pen-injector control                                    Medications  · Take your medication every day as instructed  · Do not stop taking your medicine or change the amount you are taking without instructions from your doctor or nurse  · Do Not take non-steroidal antii · You have tightness or pain in your chest  · You are extremely short of breath or can't catch your breath  · You cough up frothy pink mucous  · You feel confused or can't think clearly  · You are traveling and develop symptoms of worsening heart failure

## (undated) NOTE — IP AVS SNAPSHOT
[facility] [place] [address] [place], [address] ~ [phone]                After Visit Summary   7/3/2017    Brittany Bailey    MRN: [id number]           Visit Information     Date & Time  7/3/2017 10:30 AM Provider  HEART FAILURE Metoprolol Succinate ER 25 MG Oral Tablet 24 Hr Take 0.5 tablets (12.5 mg total) by mouth every evening.     SPIRIVA RESPIMAT 2.5 MCG/ACT Inhalation Aero Soln     Insulin Glargine (LANTUS SOLOSTAR) 100 UNIT/ML Subcutaneous Solution Pen-injector Inject 15 u Follow these instructions every day to keep yourself in the [address] you are feeling well and your symptoms are under control                                    Medications  · Take your medication every day as instructed  · Do no clothes are fitting tighter    GO TO THE EMERGENCY DEPARTMENT or CALL 911 IF:     These are signs you are in the RED ZONE - THIS IS AN EMERGENCY  · You have tightness or pain in your chest  · You are extremely short of breath or can't catch your breath  · Y use the following correction formula. Corrected Calcium Formula:      ((4.0 - Albumin) x 0.8 + Calcium    Note: Calculation is only valid when Albumin is less than 4.0g/dL.       Sodium 137   136 - 144 mmol/L Final    Potassium 3.8   3.6 - 5.1 mmol/L F

## (undated) NOTE — Clinical Note
FYI, TCM call made, see notes. NCM confirmed TCM HFU appointment on 9/28/17. Message sent with condition update to MD's office.

## (undated) NOTE — LETTER
Consent to Procedure/Sedation    Date: 9/25/2017    Time: 0828    1. I authorize the performance upon Mil[place] the following:    Triple Lumen Catheter Insertion    2.  I authorize Dr. Graham Garcia is designated as the doctor’s assistant), to Rosina Anguiano Witness: ____________________     Date: ______________    Printed: 2017   8:28 AM    Patient Name: Alber Gamez        : 1940       Medical Record #: [id number]

## (undated) NOTE — LETTER
Printed: 2017    Patient Name: Sonia Ramos  : 1940   Medical Record #: [id number]    Consent to Chemotherapy    I, Sonia Ramos, understand that I have been diagnosed with lung cancer.     I understand that the treatment suggested by my Patient Signature                                                            Date      For patients requiring translation or verbal reading of this document, the person reading/translating should document and sign below:    Bromide/ Signature

## (undated) NOTE — IP AVS SNAPSHOT
[facility] [place] [address] [place], [address] ~ [phone]                After Visit Summary   2/28/2017    Osmany Tomas    MRN: [id number]           Visit Information        Provider Department    2/28/2017  8:30 AM HEART JOSIE and for signs, symptoms of hypoglycemia or as ordered by physician    Glucose Blood (FREESTYLE TEST) In Vitro Strip Medicare Only:   If on oral medications, test blood glucose once a day in the morning before breakfast  If on insulin, test blood glucose 3 Budesonide-Formoterol Fumarate 160-4.5 MCG/ACT Inhalation Aerosol Inhale 2 puffs into the lungs 2 (two) times daily. Tiotropium Bromide Monohydrate 18 MCG Inhalation Cap Inhale 18 mcg into the lungs daily.  handhaler     Albuterol Sulfate HFA (PROAIR HF · Use the same scale and wear about the same amount of clothes each time  · A sudden weight increase is due to fluid retention rather than fat                                         Activity  · Pace your activities to avoid getting overtired  · Take rest [facility] Visit with HEART FAILURE APN [address] [facility] for [address] [facility])    [place]  [facility]   [phone]              MyChart     Visit MyChart  You can access your My

## (undated) NOTE — IP AVS SNAPSHOT
[facility] [place] [address] [place], [address] ~ [phone]                After Visit Summary   1/17/2017    Jessy Townsend    MRN: [id number]           Visit Information        Provider Department    1/17/2017  8:30 AM HEART JOSIE Non-Medicare:  Test blood glucose 4-5 times per day before meals, bedtime and for signs, symptoms of hypoglycemia or as ordered by physician    FREESTYLE LANCETS Does not apply Misc Medicare Only:   If on oral medications, test blood glucose once a day in Tiotropium Bromide Monohydrate 18 MCG Inhalation Cap Inhale 18 mcg into the lungs daily. handhaler     Albuterol Sulfate HFA (PROAIR HFA) 108 (90 BASE) MCG/ACT Inhalation Aero Soln Inhale 2 puffs into the lungs every 4 (four) hours as needed for Wheezing. · Take rest periods as needed  · Elevate your feet to reduce ankle swelling when resting                             Signs of Worsening Heart Failure    You are entering the Yellow Zone - this is a warning zone    Call your doctor or nurse if you have any If you've recently had a stay at the Hospital you can access your discharge instructions in [employer] by going to Visits < Admission Summaries.  If you've been to the Emergency Department or your doctor's office, you can view your past visit information in My

## (undated) NOTE — MR AVS SNAPSHOT
[address]  [address] [address] [phone]               Thank you for choosing us for your health care visit with Dylan Menard MD.  We are glad to serve you and happy to provide you with this mariano PANCREATIC POLYPEPTIDE PHOSPHOLIPIDS, SERUM PROINSULIN TRIGLYCEROIDS VITAMIN A (RETINOL), SER/MAGGI VITAMIN C LEVEL VITAMIN E, SERUM OR PLASMA VITAMIN K1, SERUM VITB6, PYRIDOXAL 5-PHOSPHATE VL/BRANCHED-CHAIN FATTY ACIDS            May 09, 2017  9:00 AM   Angelique conditions. When you have chronic lung diseases, it's very important to protect yourself from respiratory infections, like colds, the flu, and lung infections. Infections may cause your lung condition to worsen.  Although you can't completely avoid them, th © 8913-6740 [address], [address] [place] [address]. All rights reserved. This information is not intended as a substitute for professional medical care. Always follow your healthcare professional's instructions.              Al ordered by physician           FREEjiffstoreYLE SYSTEM Kit   Medicare Only:  If on oral medications, test blood glucose once a day in the morning before breakfast If on insulin, test blood glucose 3 times per day before meals  Non-Medicare:  Test blood glucose 4- Paricalcitol 1 MCG Caps   Take 1 capsule (1 mcg total) by mouth daily. Commonly known as:  ZEMPLAR           predniSONE 20 MG Tabs   Take 1 tablet (20 mg total) by mouth daily.    Commonly known as:  DELTASONE           spironolactone 25 MG Tabs   Take 0 Summaries. If you've been to the Emergency Department or your doctor's office, you can view your past visit information in [employer] by going to Visits < Visit Summaries. [employer] questions? Call [phone] for help.   [employer] is NOT to be used for urge

## (undated) NOTE — IP AVS SNAPSHOT
[facility] [place] [address] [place] [address], [address] [place], [place] [address] Rd ~ [phone]                After Visit Summary   6/29/2017    Miguel Ornelas    MRN: [id number]           Visit Information     Date & Time  6/29/2017  3:00 PM Provider  HEART FAILUR Metoprolol Succinate ER 25 MG Oral Tablet 24 Hr Take 0.5 tablets (12.5 mg total) by mouth every evening.     SPIRIVA RESPIMAT 2.5 MCG/ACT Inhalation Aero Soln     Insulin Glargine (LANTUS SOLOSTAR) 100 UNIT/ML Subcutaneous Solution Pen-injector Inject 15 u The Green Zone means you are feeling well and your symptoms are under control                                    Medications  · Take your medication every day as instructed  · Do not stop taking your medicine or change the amount you are taking without ins GO TO THE EMERGENCY DEPARTMENT or CALL 911 IF:     These are signs you are in the RED ZONE - THIS IS AN EMERGENCY  · You have tightness or pain in your chest  · You are extremely short of breath or can't catch your breath  · You cough up frothy pink mucous Total Calciums are not corrected for effects of low albumin. If needed, use the following correction formula. Corrected Calcium Formula:      ((4.0 - Albumin) x 0.8 + Calcium    Note: Calculation is only valid when Albumin is less than 4.0g/dL.       Christiano Evans You can access your MyChart to more actively manage your health care and view more details from this visit by going to [email or web address].   If you've recently had a stay at the Hospital you can access your discharge instructions in [address] by [email or web address]

## (undated) NOTE — MR AVS SNAPSHOT
[address]  [address] [id number] [phone]               Thank you for choosing us for your health care visit with Asif Orantes MD.  We are glad to serve you and happy to provide you with this mariano take your diabetic medication at this time. Dress warm and comfortably. No metal in your clothes, such as snaps or zippers, and please leave valuables/jewelry at home. Bring a photo ID and your insurance card to register.   Please allow 2-3 hours from yo [address], [facility] ([address] [address])    [address] [facility], [address] [place] [address] Road   [phone]              Today's Orders     EKG with interpretation and Report -IN OFFICE [[id number]]    Complete by:  As directed    Assoc Dx:   Atyp skin. Most people have shingles only once, but it is possible to have it more than once. What are the risk factors for shingles? Anyone who has ever had chickenpox can develop shingles.  But your risk is greater if you:  · Are 48years of age or older  · Shingles often goes away with no lasting effects. But some people have serious problems long after the blisters have healed:  · Postherpetic neuralgia. This is the most common complication. It is severe nerve pain at the place where the rash used to be.  It you will develop shingles. If you do develop shingles, your symptoms will likely be milder than if you hadn’t been vaccinated.  Note: Although the vaccine is licensed for people 48years of age or older, the CDC does not recommend the vaccine for those who gabapentin 100 MG Caps   Take 600 mg by mouth 2 (two) times daily. Commonly known as:  NEURONTIN           insulin aspart 100 UNIT/ML Sopn   Inject 1-68 Units into the skin 3 (three) times daily before meals.  Inject 1 unit of insulin for every 30 poi medications prescribed for you. Read the directions carefully, and ask your doctor or other care provider to review them with you. Where to Get Your Medications      These medications were sent to [facility]/PHARMACY #[id number]- [address] [facility] [address]  STAT

## (undated) NOTE — MR AVS SNAPSHOT
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 Tiotropium Bromide Monohydrate 18 MCG Inhalation Cap Inhale 18 mcg into the lungs daily.  handhaler     Albuterol Sulfate HFA (PROAIR HF or your doctor's office faxed the order, it will be available when you check in. The following tests MAY require a Fasting specimen. Please contact your physician regarding the fasting requirements.   AMINO ACIDS QUANT, PLASMA APOLIPOPROTEIN, A-1 APOLIPOPR eGFR calculated by the CKD-EPI equation. Calcium, Total 8.3  8.3-10.3  mg/dL Final    Comment:       Total Calciums are not corrected for effects of low albumin. If needed, use the following correction formula.       Corrected Calcium Formula:      ( Immature Granulocyte % 0.9   % Final               MyChart     Visit MyChart  You can access your MyChart to more actively manage your health care and view more details from this visit by going to [email or web address].   If you've recently had a st